# Patient Record
Sex: FEMALE | Race: OTHER | NOT HISPANIC OR LATINO | URBAN - METROPOLITAN AREA
[De-identification: names, ages, dates, MRNs, and addresses within clinical notes are randomized per-mention and may not be internally consistent; named-entity substitution may affect disease eponyms.]

---

## 2024-05-23 ENCOUNTER — INPATIENT (INPATIENT)
Facility: HOSPITAL | Age: 74
LOS: 5 days | Discharge: HOME HEALTH | End: 2024-05-29
Attending: HOSPITALIST | Admitting: HOSPITALIST
Payer: MEDICAID

## 2024-05-23 VITALS
HEART RATE: 106 BPM | DIASTOLIC BLOOD PRESSURE: 76 MMHG | RESPIRATION RATE: 16 BRPM | TEMPERATURE: 101 F | OXYGEN SATURATION: 100 % | WEIGHT: 160.94 LBS | SYSTOLIC BLOOD PRESSURE: 118 MMHG

## 2024-05-23 PROCEDURE — 99285 EMERGENCY DEPT VISIT HI MDM: CPT

## 2024-05-23 RX ORDER — ACETAMINOPHEN 500 MG
1000 TABLET ORAL ONCE
Refills: 0 | Status: COMPLETED | OUTPATIENT
Start: 2024-05-23 | End: 2024-05-23

## 2024-05-23 RX ORDER — CEFTRIAXONE 500 MG/1
1000 INJECTION, POWDER, FOR SOLUTION INTRAMUSCULAR; INTRAVENOUS ONCE
Refills: 0 | Status: COMPLETED | OUTPATIENT
Start: 2024-05-23 | End: 2024-05-23

## 2024-05-23 RX ORDER — ONDANSETRON 8 MG/1
4 TABLET, FILM COATED ORAL ONCE
Refills: 0 | Status: COMPLETED | OUTPATIENT
Start: 2024-05-23 | End: 2024-05-23

## 2024-05-23 RX ORDER — SODIUM CHLORIDE 9 MG/ML
1000 INJECTION INTRAMUSCULAR; INTRAVENOUS; SUBCUTANEOUS ONCE
Refills: 0 | Status: COMPLETED | OUTPATIENT
Start: 2024-05-23 | End: 2024-05-23

## 2024-05-23 NOTE — ED PROVIDER NOTE - OBJECTIVE STATEMENT
74-year-old female Persian speaking past medical history of hypertension presenting with vomiting and chills.  Patient states she was diagnosed with a UTI yesterday and started ciprofloxacin antibiotics today.  States 1 dose started at 4 PM and then began to have chills with rigors.  Endorses 2 episodes of nonbloody nonbilious vomiting.  Endorsing lower abdominal pain as well as right-sided back pain.  Denies any chest pain, shortness of breath, diarrhea, constipation.  Accompanied by son at bedside for translation

## 2024-05-23 NOTE — ED ADULT TRIAGE NOTE - CHIEF COMPLAINT QUOTE
pt c/o vomiting and chills today after starting antibiotics (cipro 250mg) for UTI. also c/o lower abd and back pain. hx. htn. pt febrile in triage.

## 2024-05-23 NOTE — ED PROVIDER NOTE - ATTENDING CONTRIBUTION TO CARE
DR. MCLEAN, ATTENDING MD-  I performed a face to face bedside interview with the patient regarding history of present illness, review of symptoms and past medical history. I completed an independent physical exam.  I have discussed the patient's plan of care with the resident.   Documentation as above in the note.    73 y/o female h/o htn with n/v chills r flank pain suprapubic pain since yesterday.  Started by pcp on cipro yesterday.  Worsening sx today.  Tachy febrile r cvat.  Likely pyelo.  Obtain cbc cmp blood cx x 2 vbg ua ucx ct a/p give ivf bolus abx antipyretic admit for further care and evaluation.

## 2024-05-23 NOTE — ED PROVIDER NOTE - PROGRESS NOTE DETAILS
Alfred Escalante DO (PGY2)  Notified by nurse that patient had low blood pressure systolic in the 80s, maps have been above 65, patient mentating well.  Patient still getting second liter of fluids.  Patient pending CT scan. Alfred Escalante DO (PGY2)  Patient still persistently hypotensive with maps from 62-65.  MICU was consulted for urosepsis and hypotension Alfred Escalante DO (PGY2)  Patient evaluated by MICU, patient not a MICU candidate at this time, maps above 65 on recent blood pressures, patient mentating well with improvement of symptoms.  Recommending midodrine 10 every 8 hours.  Will admit to medicine Alfred Escalante DO (PGY2)  Patient evaluated by MICU, Bedside POCUS was performed and minimal visualization of IVC. patient not a MICU candidate at this time And is rejected by the MICU team. patient mentating well with improvement of symptoms, no acute distress.  Recommending midodrine 10 every 8 hours.  Will admit to medicine

## 2024-05-23 NOTE — ED PROVIDER NOTE - CLINICAL SUMMARY MEDICAL DECISION MAKING FREE TEXT BOX
74-year-old female Hebrew speaking past medical history of hypertension presenting with vomiting and chills.  Patient states she was diagnosed with a UTI yesterday and started ciprofloxacin antibiotics today.  States 1 dose started at 4 PM and then began to have chills with rigors.  Endorses 2 episodes of nonbloody nonbilious vomiting.  Endorsing lower abdominal pain as well as right-sided back pain.   Vital signs remarkable for tachycardia, febrile. Plan for sepsis workup and CT abd pelvis  Differential diagnosis includes but not limited to pyelonephritis vs. intraabdominal pathology

## 2024-05-23 NOTE — ED PROVIDER NOTE - PHYSICAL EXAMINATION
Alfred Escalante DO (PGY2)   Physical Exam:    Gen: NAD, AOx3  Head: NCAT  HEENT: EOMI, PEERLA  Lung: CTAB, no respiratory distress, no wheezes/rhonchi/rales B/L  CV: RRR, no murmurs, rubs or gallops  Abd: soft, suprapubic tenderness to palpation, ND, no guarding, no rigidity, no rebound tenderness, no CVA tenderness   MSK: no visible deformities, ROM normal in UE/LE, no back pain  Neuro: No focal sensory or motor deficits. Sensation intact to light touch all extremities.  Skin: Warm, well perfused, no rash, no leg swelling  Psych: normal affect, calm

## 2024-05-24 DIAGNOSIS — I10 ESSENTIAL (PRIMARY) HYPERTENSION: ICD-10-CM

## 2024-05-24 DIAGNOSIS — N30.90 CYSTITIS, UNSPECIFIED WITHOUT HEMATURIA: ICD-10-CM

## 2024-05-24 DIAGNOSIS — K52.9 NONINFECTIVE GASTROENTERITIS AND COLITIS, UNSPECIFIED: ICD-10-CM

## 2024-05-24 DIAGNOSIS — Z86.018 PERSONAL HISTORY OF OTHER BENIGN NEOPLASM: Chronic | ICD-10-CM

## 2024-05-24 DIAGNOSIS — N39.0 URINARY TRACT INFECTION, SITE NOT SPECIFIED: ICD-10-CM

## 2024-05-24 LAB
ADD ON TEST-SPECIMEN IN LAB: SIGNIFICANT CHANGE UP
ALBUMIN SERPL ELPH-MCNC: 3.9 G/DL — SIGNIFICANT CHANGE UP (ref 3.3–5)
ALP SERPL-CCNC: 101 U/L — SIGNIFICANT CHANGE UP (ref 40–120)
ALT FLD-CCNC: 13 U/L — SIGNIFICANT CHANGE UP (ref 4–33)
ANION GAP SERPL CALC-SCNC: 15 MMOL/L — HIGH (ref 7–14)
APPEARANCE UR: ABNORMAL
APTT BLD: 21.3 SEC — LOW (ref 24.5–35.6)
AST SERPL-CCNC: 23 U/L — SIGNIFICANT CHANGE UP (ref 4–32)
BACTERIA # UR AUTO: ABNORMAL /HPF
BASOPHILS # BLD AUTO: 0 K/UL — SIGNIFICANT CHANGE UP (ref 0–0.2)
BASOPHILS NFR BLD AUTO: 0 % — SIGNIFICANT CHANGE UP (ref 0–2)
BILIRUB SERPL-MCNC: 1.8 MG/DL — HIGH (ref 0.2–1.2)
BILIRUB UR-MCNC: NEGATIVE — SIGNIFICANT CHANGE UP
BLOOD GAS VENOUS COMPREHENSIVE RESULT: SIGNIFICANT CHANGE UP
BLOOD GAS VENOUS COMPREHENSIVE RESULT: SIGNIFICANT CHANGE UP
BUN SERPL-MCNC: 21 MG/DL — SIGNIFICANT CHANGE UP (ref 7–23)
CALCIUM SERPL-MCNC: 9.1 MG/DL — SIGNIFICANT CHANGE UP (ref 8.4–10.5)
CAST: 4 /LPF — SIGNIFICANT CHANGE UP (ref 0–4)
CHLORIDE SERPL-SCNC: 99 MMOL/L — SIGNIFICANT CHANGE UP (ref 98–107)
CO2 SERPL-SCNC: 21 MMOL/L — LOW (ref 22–31)
COLOR SPEC: SIGNIFICANT CHANGE UP
CREAT SERPL-MCNC: 1.06 MG/DL — SIGNIFICANT CHANGE UP (ref 0.5–1.3)
DIFF PNL FLD: ABNORMAL
EGFR: 55 ML/MIN/1.73M2 — LOW
EOSINOPHIL # BLD AUTO: 0.06 K/UL — SIGNIFICANT CHANGE UP (ref 0–0.5)
EOSINOPHIL NFR BLD AUTO: 0.9 % — SIGNIFICANT CHANGE UP (ref 0–6)
GLUCOSE SERPL-MCNC: 113 MG/DL — HIGH (ref 70–99)
GLUCOSE UR QL: NEGATIVE MG/DL — SIGNIFICANT CHANGE UP
HCT VFR BLD CALC: 43.2 % — SIGNIFICANT CHANGE UP (ref 34.5–45)
HGB BLD-MCNC: 14.4 G/DL — SIGNIFICANT CHANGE UP (ref 11.5–15.5)
IANC: 6.75 K/UL — SIGNIFICANT CHANGE UP (ref 1.8–7.4)
INR BLD: 1.18 RATIO — SIGNIFICANT CHANGE UP (ref 0.85–1.18)
KETONES UR-MCNC: NEGATIVE MG/DL — SIGNIFICANT CHANGE UP
LEUKOCYTE ESTERASE UR-ACNC: ABNORMAL
LYMPHOCYTES # BLD AUTO: 0.2 K/UL — LOW (ref 1–3.3)
LYMPHOCYTES # BLD AUTO: 2.8 % — LOW (ref 13–44)
MCHC RBC-ENTMCNC: 29 PG — SIGNIFICANT CHANGE UP (ref 27–34)
MCHC RBC-ENTMCNC: 33.3 GM/DL — SIGNIFICANT CHANGE UP (ref 32–36)
MCV RBC AUTO: 86.9 FL — SIGNIFICANT CHANGE UP (ref 80–100)
MONOCYTES # BLD AUTO: 0 K/UL — SIGNIFICANT CHANGE UP (ref 0–0.9)
MONOCYTES NFR BLD AUTO: 0 % — LOW (ref 2–14)
MUCOUS THREADS # UR AUTO: PRESENT
NEUTROPHILS # BLD AUTO: 6.94 K/UL — SIGNIFICANT CHANGE UP (ref 1.8–7.4)
NEUTROPHILS NFR BLD AUTO: 90.8 % — HIGH (ref 43–77)
NITRITE UR-MCNC: POSITIVE
PH UR: 6 — SIGNIFICANT CHANGE UP (ref 5–8)
PLATELET # BLD AUTO: 216 K/UL — SIGNIFICANT CHANGE UP (ref 150–400)
POTASSIUM SERPL-MCNC: 3.2 MMOL/L — LOW (ref 3.5–5.3)
POTASSIUM SERPL-SCNC: 3.2 MMOL/L — LOW (ref 3.5–5.3)
PROT SERPL-MCNC: 7.1 G/DL — SIGNIFICANT CHANGE UP (ref 6–8.3)
PROT UR-MCNC: 30 MG/DL
PROTHROM AB SERPL-ACNC: 13.1 SEC — HIGH (ref 9.5–13)
RBC # BLD: 4.97 M/UL — SIGNIFICANT CHANGE UP (ref 3.8–5.2)
RBC # FLD: 13.3 % — SIGNIFICANT CHANGE UP (ref 10.3–14.5)
RBC CASTS # UR COMP ASSIST: 4 /HPF — SIGNIFICANT CHANGE UP (ref 0–4)
REVIEW: SIGNIFICANT CHANGE UP
SODIUM SERPL-SCNC: 135 MMOL/L — SIGNIFICANT CHANGE UP (ref 135–145)
SP GR SPEC: 1.01 — SIGNIFICANT CHANGE UP (ref 1–1.03)
SQUAMOUS # UR AUTO: 2 /HPF — SIGNIFICANT CHANGE UP (ref 0–5)
UROBILINOGEN FLD QL: 1 MG/DL — SIGNIFICANT CHANGE UP (ref 0.2–1)
WBC # BLD: 7.21 K/UL — SIGNIFICANT CHANGE UP (ref 3.8–10.5)
WBC # FLD AUTO: 7.21 K/UL — SIGNIFICANT CHANGE UP (ref 3.8–10.5)
WBC CLUMPS # UR AUTO: PRESENT
WBC UR QL: 228 /HPF — HIGH (ref 0–5)

## 2024-05-24 PROCEDURE — 99291 CRITICAL CARE FIRST HOUR: CPT | Mod: GC

## 2024-05-24 PROCEDURE — 74177 CT ABD & PELVIS W/CONTRAST: CPT | Mod: 26,MC

## 2024-05-24 PROCEDURE — 99291 CRITICAL CARE FIRST HOUR: CPT

## 2024-05-24 RX ORDER — LOSARTAN POTASSIUM 100 MG/1
1 TABLET, FILM COATED ORAL
Refills: 0 | DISCHARGE

## 2024-05-24 RX ORDER — ASPIRIN/CALCIUM CARB/MAGNESIUM 324 MG
81 TABLET ORAL DAILY
Refills: 0 | Status: DISCONTINUED | OUTPATIENT
Start: 2024-05-24 | End: 2024-05-29

## 2024-05-24 RX ORDER — POTASSIUM CHLORIDE 20 MEQ
40 PACKET (EA) ORAL ONCE
Refills: 0 | Status: COMPLETED | OUTPATIENT
Start: 2024-05-24 | End: 2024-05-24

## 2024-05-24 RX ORDER — MIDODRINE HYDROCHLORIDE 2.5 MG/1
10 TABLET ORAL EVERY 8 HOURS
Refills: 0 | Status: DISCONTINUED | OUTPATIENT
Start: 2024-05-24 | End: 2024-05-25

## 2024-05-24 RX ORDER — ACETAMINOPHEN 500 MG
650 TABLET ORAL EVERY 6 HOURS
Refills: 0 | Status: DISCONTINUED | OUTPATIENT
Start: 2024-05-24 | End: 2024-05-29

## 2024-05-24 RX ORDER — PIPERACILLIN AND TAZOBACTAM 4; .5 G/20ML; G/20ML
3.38 INJECTION, POWDER, LYOPHILIZED, FOR SOLUTION INTRAVENOUS ONCE
Refills: 0 | Status: COMPLETED | OUTPATIENT
Start: 2024-05-24 | End: 2024-05-24

## 2024-05-24 RX ORDER — SODIUM,POTASSIUM PHOSPHATES 278-250MG
1 POWDER IN PACKET (EA) ORAL
Refills: 0 | Status: COMPLETED | OUTPATIENT
Start: 2024-05-24 | End: 2024-05-24

## 2024-05-24 RX ORDER — ONDANSETRON 8 MG/1
4 TABLET, FILM COATED ORAL EVERY 8 HOURS
Refills: 0 | Status: DISCONTINUED | OUTPATIENT
Start: 2024-05-24 | End: 2024-05-29

## 2024-05-24 RX ORDER — ASPIRIN/CALCIUM CARB/MAGNESIUM 324 MG
1 TABLET ORAL
Refills: 0 | DISCHARGE

## 2024-05-24 RX ORDER — MIDODRINE HYDROCHLORIDE 2.5 MG/1
10 TABLET ORAL ONCE
Refills: 0 | Status: COMPLETED | OUTPATIENT
Start: 2024-05-24 | End: 2024-05-24

## 2024-05-24 RX ORDER — PIPERACILLIN AND TAZOBACTAM 4; .5 G/20ML; G/20ML
3.38 INJECTION, POWDER, LYOPHILIZED, FOR SOLUTION INTRAVENOUS EVERY 8 HOURS
Refills: 0 | Status: DISCONTINUED | OUTPATIENT
Start: 2024-05-24 | End: 2024-05-26

## 2024-05-24 RX ORDER — SODIUM CHLORIDE 9 MG/ML
1000 INJECTION, SOLUTION INTRAVENOUS
Refills: 0 | Status: DISCONTINUED | OUTPATIENT
Start: 2024-05-24 | End: 2024-05-25

## 2024-05-24 RX ORDER — SIMVASTATIN 20 MG/1
1 TABLET, FILM COATED ORAL
Refills: 0 | DISCHARGE

## 2024-05-24 RX ORDER — MIDODRINE HYDROCHLORIDE 2.5 MG/1
5 TABLET ORAL ONCE
Refills: 0 | Status: COMPLETED | OUTPATIENT
Start: 2024-05-24 | End: 2024-05-24

## 2024-05-24 RX ORDER — SODIUM CHLORIDE 9 MG/ML
1000 INJECTION INTRAMUSCULAR; INTRAVENOUS; SUBCUTANEOUS ONCE
Refills: 0 | Status: COMPLETED | OUTPATIENT
Start: 2024-05-24 | End: 2024-05-24

## 2024-05-24 RX ORDER — SODIUM CHLORIDE 9 MG/ML
500 INJECTION INTRAMUSCULAR; INTRAVENOUS; SUBCUTANEOUS ONCE
Refills: 0 | Status: COMPLETED | OUTPATIENT
Start: 2024-05-24 | End: 2024-05-24

## 2024-05-24 RX ADMIN — SODIUM CHLORIDE 1000 MILLILITER(S): 9 INJECTION INTRAMUSCULAR; INTRAVENOUS; SUBCUTANEOUS at 04:01

## 2024-05-24 RX ADMIN — Medication 650 MILLIGRAM(S): at 15:26

## 2024-05-24 RX ADMIN — PIPERACILLIN AND TAZOBACTAM 200 GRAM(S): 4; .5 INJECTION, POWDER, LYOPHILIZED, FOR SOLUTION INTRAVENOUS at 05:25

## 2024-05-24 RX ADMIN — MIDODRINE HYDROCHLORIDE 5 MILLIGRAM(S): 2.5 TABLET ORAL at 10:25

## 2024-05-24 RX ADMIN — MIDODRINE HYDROCHLORIDE 10 MILLIGRAM(S): 2.5 TABLET ORAL at 16:55

## 2024-05-24 RX ADMIN — ONDANSETRON 4 MILLIGRAM(S): 8 TABLET, FILM COATED ORAL at 12:52

## 2024-05-24 RX ADMIN — Medication 81 MILLIGRAM(S): at 22:11

## 2024-05-24 RX ADMIN — SODIUM CHLORIDE 70 MILLILITER(S): 9 INJECTION, SOLUTION INTRAVENOUS at 08:01

## 2024-05-24 RX ADMIN — SODIUM CHLORIDE 1000 MILLILITER(S): 9 INJECTION INTRAMUSCULAR; INTRAVENOUS; SUBCUTANEOUS at 01:12

## 2024-05-24 RX ADMIN — SODIUM CHLORIDE 70 MILLILITER(S): 9 INJECTION, SOLUTION INTRAVENOUS at 22:08

## 2024-05-24 RX ADMIN — Medication 40 MILLIEQUIVALENT(S): at 03:02

## 2024-05-24 RX ADMIN — PIPERACILLIN AND TAZOBACTAM 25 GRAM(S): 4; .5 INJECTION, POWDER, LYOPHILIZED, FOR SOLUTION INTRAVENOUS at 15:27

## 2024-05-24 RX ADMIN — ONDANSETRON 4 MILLIGRAM(S): 8 TABLET, FILM COATED ORAL at 22:09

## 2024-05-24 RX ADMIN — Medication 1 PACKET(S): at 08:01

## 2024-05-24 RX ADMIN — Medication 1 PACKET(S): at 16:56

## 2024-05-24 RX ADMIN — Medication 1 PACKET(S): at 12:47

## 2024-05-24 RX ADMIN — MIDODRINE HYDROCHLORIDE 10 MILLIGRAM(S): 2.5 TABLET ORAL at 05:52

## 2024-05-24 RX ADMIN — SODIUM CHLORIDE 1000 MILLILITER(S): 9 INJECTION INTRAMUSCULAR; INTRAVENOUS; SUBCUTANEOUS at 00:15

## 2024-05-24 RX ADMIN — Medication 400 MILLIGRAM(S): at 00:51

## 2024-05-24 RX ADMIN — ONDANSETRON 4 MILLIGRAM(S): 8 TABLET, FILM COATED ORAL at 00:15

## 2024-05-24 RX ADMIN — CEFTRIAXONE 100 MILLIGRAM(S): 500 INJECTION, POWDER, FOR SOLUTION INTRAMUSCULAR; INTRAVENOUS at 00:16

## 2024-05-24 RX ADMIN — SODIUM CHLORIDE 70 MILLILITER(S): 9 INJECTION, SOLUTION INTRAVENOUS at 10:26

## 2024-05-24 RX ADMIN — PIPERACILLIN AND TAZOBACTAM 25 GRAM(S): 4; .5 INJECTION, POWDER, LYOPHILIZED, FOR SOLUTION INTRAVENOUS at 22:08

## 2024-05-24 NOTE — H&P ADULT - PROBLEM SELECTOR PLAN 2
- Not clinically a colitis despite CT A&P findings  - Regardless, above antibiotics would cover this as well

## 2024-05-24 NOTE — H&P ADULT - NSHPREVIEWOFSYSTEMS_GEN_ALL_CORE
GEN: no night sweats; +poor appetite  EYES: no changes in vision or diplopia   ENT: no epistaxis, sinus pain, gingival bleeding, odynophagia or dysphagia  CV: no CP, PND or palpitations  RESP: no cough, wheezing, or hemoptysis  GI: no hematemesis, hematochezia, or melena; +nausea/vomiting  : +dysuria, +polyuria  MSK: no arthralgias or joint swelling   NEURO: no gross sensory changes, numbness, focal deficits  PSYCH: no depression or changes in concentration  HEME/ONC: no purpura, petechiae or night sweats  SKIN: no pruritus, hair loss or skin lesions  ALL: no photosensitivity, no complaints of anaphylaxis (SOB, throat swelling)

## 2024-05-24 NOTE — ED ADULT NURSE NOTE - OBJECTIVE STATEMENT
Received pt in 26a, pt is A&Ox4 and is Persian speaking with son at bedside translation. Pt past medical history of HTN, HLD, Pt came to the ED due to having lower abdomen pain and back pain for 2 days. pt went to her PCP and was given an antibiotic for a UTI and she started taking it but doesn't remember the name. Pt breathing is equal and nonlabored. Pt abdomen is soft and nondistended. Pt reports vomiting x2 since yesterday with some nausea and fevers and chills. Pt denies any chest pain, SOB, headache or diarrhea. pt has a 20g to the left forearm. Pt safety maintained.

## 2024-05-24 NOTE — CONSULT NOTE ADULT - ASSESSMENT
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 She is breathing comfortably, not tachycardic. She feels much better and looks well overall (if irritated/tired). Thankfully, her lactate has resolved and her other organs seem to be unaffected by the borderline blood pressures. She's feeling symptomatically better.    RECOMMENDATIONS  - Can consider Midodrine 10mg PO Q8H if concerned about borderline hypotension (Goal MAP > 65 or SBP > 100), reassess BP 30m-1h after (onset of action ~1h after taking)  - Would replete K > 4, Mg > 2, Phos > 3  - Pt currently not meeting criteria for ICU-level care, but please reconsult if her condition changes    João Barry MD PGY-3  Case D/W Dr. Monique

## 2024-05-24 NOTE — CONSULT NOTE ADULT - SUBJECTIVE AND OBJECTIVE BOX
SUBJECTIVE  CONSULT QUESTION: Patient hypotensive with +UA  SUMMARY OF HOSPITALIZATION / HPI  Ms. Leone is a 74F with h/o atrial myxoma (s/p removal), HTN who presented with chlils, vomiting, and dysuria ico outpatient-diagnosed UTI. She was started on ciprofloxacin outpatient but did not feel better so came to the ER. She feels better already here, no further vomiting or other symptoms. She is annoyed about IV access issues in the ER. Her son at [facility] interprets for her.  She denies CP/SOB, abdominal pain, diarrhea, numbness/tingling.    PAST MEDICAL/SURGICAL HISTORY  PAST MEDICAL & SURGICAL HISTORY:    ALLERGIES  Allergies    No Known Allergies    Intolerances        HOME MEDICATIONS:      HOSPITAL MEDICATIONS:  MEDICATIONS  (STANDING):  midodrine. 10 milliGRAM(s) Oral once    MEDICATIONS  (PRN):      REVIEW OF SYSTEMS:  [x] All other systems negative  [ ] Unable to assess ROS because ________    OBJECTIVE:  VITAL SIGNS  ICU Vital Signs Last 24 Hrs  T(C): 36.9 (24 May 2024 04:42), Max: 38.6 (23 May 2024 22:16)  T(F): 98.5 (24 May 2024 04:42), Max: 101.5 (23 May 2024 22:16)  HR: 62 (24 May 2024 04:42) (62 - 106)  BP: 82/61 (24 May 2024 05:43) (78/57 - 118/76)  BP(mean): 71 (24 May 2024 05:43) (66 - 71)  ABP: --  ABP(mean): --  RR: 22 (24 May 2024 04:42) (16 - 22)  SpO2: 99% (24 May 2024 04:42) (97% - 100%)    O2 Parameters below as of 24 May 2024 04:42  Patient On (Oxygen Delivery Method): room air    PHYSICAL EXAM:  GEN: Awake, AOx3, NAD.  HEENT: NCAT  CARDIO: RRR. Normal S1/S2, no m/r/g. No JVD.  RESP: CTAB, no w/r/r; normal respiratory effort  ABD: Soft, NTND.   MSK: No obvious deformity or ROM deficit.   SKIN: Warm, dry.   NEURO: Moves all four extremities spontaneously    LAB DATA                        14.4   7.21  )-----------( 216      ( 24 May 2024 00:00 )             43.2         135  |  99  |  21  ----------------------------<  113<H>  3.2<L>   |  21<L>  |  1.06    Ca    9.1      24 May 2024 00:00  Phos  2.1       Mg     1.90         TPro  7.1  /  Alb  3.9  /  TBili  1.8<H>  /  DBili  0.5<H>  /  AST  23  /  ALT  13  /  AlkPhos  101      PT/INR - ( 24 May 2024 00:00 )   PT: 13.1 sec;   INR: 1.18 ratio         PTT - ( 24 May 2024 00:00 )  PTT:21.3 sec      Urinalysis Basic - ( 24 May 2024 00:10 )    Color: Dark Yellow / Appearance: Cloudy / S.015 / pH: x  Gluc: x / Ketone: Negative mg/dL  / Bili: Negative / Urobili: 1.0 mg/dL   Blood: x / Protein: 30 mg/dL / Nitrite: Positive   Leuk Esterase: Large / RBC: 4 /HPF /  /HPF   Sq Epi: x / Non Sq Epi: 2 /HPF / Bacteria: Few /HPF      CAPILLARY BLOOD GLUCOSE      ADDITIONAL TESTS & IMAGING  RADIOLOGY:  < from: CT Abdomen and Pelvis w/ IV Cont (24 @ 02:34) >  FINDINGS:  Liver: Normal. No mass.  Gallbladder and bile ducts: Normal. No calcified stones. No ductal   dilation.  Pancreas: Normal. No ductal dilation.  Spleen: Normal. No splenomegaly.  Adrenal glands: Normal. No mass.  Kidneys and ureters: Prominent simple 6.0 cm left renal cyst.  Stomach and bowel: Sigmoid diverticulosis. No significant diverticulitis   or  other acute process noted.  Appendix: No evidence of appendicitis.    Intraperitoneal space: Unremarkable. No free air. No significant fluid  collection.  Vasculature: Unremarkable. No abdominal aortic aneurysm.  Lymph nodes: Unremarkable. No enlarged lymph nodes.  Urinary bladder: Unremarkable as visualized.  Reproductive: Unremarkable as visualized.  Bones/joints: Unremarkable. No acute fracture.  Soft tissues: Unremarkable.    IMPRESSION:  1.   No acute process to explain the patient&apos;s symptoms.  2.   Diverticulosis without diverticulitis.    < end of copied text >    MICROBIOLOGY:   UCX, BCx reportedly collected    CARDIOLOGY DATA:

## 2024-05-24 NOTE — CONSULT NOTE ADULT - ATTENDING COMMENTS
74 f with sepsis likely 2/2 UTI c/b n/v  pancx, trend lactate, procalcitonin, Abx  consider midodrine 10mg TID as tolerated  maintain MAP >65  follow up Ct scan   reconsult as needed

## 2024-05-24 NOTE — H&P ADULT - HISTORY OF PRESENT ILLNESS
74F with PMHx of HTN presenting for several day history of lower abdominal pain, polyuria, dysuria, fever, rigors, and night sweats. Collateral obtained from son at bedside (patient's native language is Macedonian). Per son several days prior patient began to develop above symptoms. She states abdominal pain is bilateral LE abdominal pain which radiates a in a bandlike fashion toward the back. Also endorsing minor urinary complaints. She went to her PMD and was empirically treated for UTI and given Ciprofloxacin (per son work up was sent i.e., UA, but not followed up on yet). She took only one dose of Ciprofloxacin. On the day of admission developed rigors and diaphoresis. Given these symptoms son brought patient in for further evaluation. Patient denies diarrhea. Has had periodic nausea and poor PO intake. In the ED on arrival was febrile to 101. 5 F and hypotensive to 82/61. She was given Zosyn, Ceftriaxone and 2.5 L NS. Seen by MICU and not a candidate. Started on midodrine to help support blood pressure. CT A&P showed: "Wall thickening of the descending and proximal sigmoid colon consistent with colitis." Patient without major complaints when evaluated by medicine in the ED. Denies lightheadedness/dizziness.

## 2024-05-24 NOTE — H&P ADULT - PROBLEM SELECTOR PLAN 1
- Likely UTI given symptoms and UA. No signs of ascending infection on CT  - Continue with Zosyn, de-escalate if possible  - Follow up blood and urine cultures  - Gently hydrate for now  - Start Midodrine 10 mg TID to support BP, wean as tolerated

## 2024-05-24 NOTE — H&P ADULT - NSHPPHYSICALEXAM_GEN_ALL_CORE
T(C): 36.9 (05-24-24 @ 04:42), Max: 38.6 (05-23-24 @ 22:16)  HR: 78 (05-24-24 @ 07:01) (62 - 106)  BP: 80/59 (05-24-24 @ 07:01) (78/57 - 118/76)  RR: 18 (05-24-24 @ 07:01) (16 - 22)  SpO2: 98% (05-24-24 @ 07:01) (97% - 100%)    GEN: female in NAD, appears comfortable, no diaphoresis  EYES: No scleral injection, PERRL, EOMI  ENTM: neck supple & symmetric without tracheal deviation, dry membranes, no gross hearing impairment, thyroid gland not enlarged  CV: +S1/S2, no m/r/g, no abdominal bruit, no LE edema  RESP: breathing comfortably, no respiratory accessory muscle use, CTAB, no w/r/r  GI: normoactive BS, soft, NTND, no rebounding/guarding, no palpable masses  LYMPHATICS: no LAD or tenderness to palpation  NEURO: AOx3, no focal deficits, CNII-XII grossly intact  PSYCH: No SI/HI/AVH, appropriate affect, appropriate insight/judgment   SKIN: no petechiae, ecchymosis or maculopapular rash noted

## 2024-05-24 NOTE — H&P ADULT - NSHPPOAPULMEMBOLUS_GEN_A_CORE
no
PAST MEDICAL HISTORY:  Asthma     Fractured hip     Hyperlipidemia     Hypertension     Lumbar back pain Pinched nerve

## 2024-05-24 NOTE — ED ADULT NURSE REASSESSMENT NOTE - NS ED NURSE REASSESS COMMENT FT1
Pt was vitaled and B/P was Low, MD Gonzales was made aware. Pt denies any headache, dizziness, SOB, lightheadedness. Pt moved to room 28 and placed on cardiac monitor. Vitals was repeated and still has a low B/P but Map was 65 and above. MD notified and ordered to continue to monitor. Pt is normal sinus on cardiac monitor.

## 2024-05-24 NOTE — PATIENT PROFILE ADULT - NSPRESCRUSEDDRG_GEN_A_NUR
,DirectAddress_Unknown,[email or web address]
No

## 2024-05-24 NOTE — CHART NOTE - NSCHARTNOTEFT_GEN_A_CORE
Called by RN due to patient having BP of 76/47, Asymptomatic. Patient denies lightheadedness, dizziness. RN states patient had walk to the bathroom without any complaints or problems. I informed the RN, patient should not be ambulating at this time. Patient ordered for stat Midodrine 5 mg, continue with Midodrine 10 mg TID, IVF LR @ 70 mL/hr.

## 2024-05-24 NOTE — H&P ADULT - ASSESSMENT
74F with PMHx of HTN presenting for several day history of lower abdominal pain, polyuria, dysuria, fever, rigors, and night sweats. Patient on arrival febrile and hypotensive. UA with pyuria. Patient admitted for sepsis secondary to cystitis.

## 2024-05-24 NOTE — PATIENT PROFILE ADULT - FALL HARM RISK - HARM RISK INTERVENTIONS

## 2024-05-25 DIAGNOSIS — A41.9 SEPSIS, UNSPECIFIED ORGANISM: ICD-10-CM

## 2024-05-25 LAB
ADD ON TEST-SPECIMEN IN LAB: SIGNIFICANT CHANGE UP
ANION GAP SERPL CALC-SCNC: 14 MMOL/L — SIGNIFICANT CHANGE UP (ref 7–14)
BUN SERPL-MCNC: 20 MG/DL — SIGNIFICANT CHANGE UP (ref 7–23)
CALCIUM SERPL-MCNC: 8.2 MG/DL — LOW (ref 8.4–10.5)
CHLORIDE SERPL-SCNC: 105 MMOL/L — SIGNIFICANT CHANGE UP (ref 98–107)
CO2 SERPL-SCNC: 19 MMOL/L — LOW (ref 22–31)
CREAT SERPL-MCNC: 1.05 MG/DL — SIGNIFICANT CHANGE UP (ref 0.5–1.3)
EGFR: 56 ML/MIN/1.73M2 — LOW
GLUCOSE SERPL-MCNC: 125 MG/DL — HIGH (ref 70–99)
HCT VFR BLD CALC: 36.5 % — SIGNIFICANT CHANGE UP (ref 34.5–45)
HGB BLD-MCNC: 12.2 G/DL — SIGNIFICANT CHANGE UP (ref 11.5–15.5)
MAGNESIUM SERPL-MCNC: 2.1 MG/DL — SIGNIFICANT CHANGE UP (ref 1.6–2.6)
MCHC RBC-ENTMCNC: 29.8 PG — SIGNIFICANT CHANGE UP (ref 27–34)
MCHC RBC-ENTMCNC: 33.4 GM/DL — SIGNIFICANT CHANGE UP (ref 32–36)
MCV RBC AUTO: 89.2 FL — SIGNIFICANT CHANGE UP (ref 80–100)
NRBC # BLD: 0 /100 WBCS — SIGNIFICANT CHANGE UP (ref 0–0)
NRBC # FLD: 0 K/UL — SIGNIFICANT CHANGE UP (ref 0–0)
PHOSPHATE SERPL-MCNC: 2.6 MG/DL — SIGNIFICANT CHANGE UP (ref 2.5–4.5)
PLATELET # BLD AUTO: 197 K/UL — SIGNIFICANT CHANGE UP (ref 150–400)
POTASSIUM SERPL-MCNC: 3.7 MMOL/L — SIGNIFICANT CHANGE UP (ref 3.5–5.3)
POTASSIUM SERPL-SCNC: 3.7 MMOL/L — SIGNIFICANT CHANGE UP (ref 3.5–5.3)
RBC # BLD: 4.09 M/UL — SIGNIFICANT CHANGE UP (ref 3.8–5.2)
RBC # FLD: 13.9 % — SIGNIFICANT CHANGE UP (ref 10.3–14.5)
SODIUM SERPL-SCNC: 138 MMOL/L — SIGNIFICANT CHANGE UP (ref 135–145)
WBC # BLD: 17.91 K/UL — HIGH (ref 3.8–10.5)
WBC # FLD AUTO: 17.91 K/UL — HIGH (ref 3.8–10.5)

## 2024-05-25 PROCEDURE — 99232 SBSQ HOSP IP/OBS MODERATE 35: CPT

## 2024-05-25 RX ORDER — SODIUM CHLORIDE 9 MG/ML
1000 INJECTION, SOLUTION INTRAVENOUS
Refills: 0 | Status: DISCONTINUED | OUTPATIENT
Start: 2024-05-25 | End: 2024-05-27

## 2024-05-25 RX ORDER — METOCLOPRAMIDE HCL 10 MG
10 TABLET ORAL ONCE
Refills: 0 | Status: COMPLETED | OUTPATIENT
Start: 2024-05-25 | End: 2024-05-25

## 2024-05-25 RX ORDER — MIDODRINE HYDROCHLORIDE 2.5 MG/1
5 TABLET ORAL EVERY 8 HOURS
Refills: 0 | Status: DISCONTINUED | OUTPATIENT
Start: 2024-05-25 | End: 2024-05-27

## 2024-05-25 RX ORDER — ENOXAPARIN SODIUM 100 MG/ML
40 INJECTION SUBCUTANEOUS EVERY 24 HOURS
Refills: 0 | Status: DISCONTINUED | OUTPATIENT
Start: 2024-05-25 | End: 2024-05-29

## 2024-05-25 RX ADMIN — MIDODRINE HYDROCHLORIDE 10 MILLIGRAM(S): 2.5 TABLET ORAL at 00:56

## 2024-05-25 RX ADMIN — Medication 81 MILLIGRAM(S): at 21:53

## 2024-05-25 RX ADMIN — PIPERACILLIN AND TAZOBACTAM 25 GRAM(S): 4; .5 INJECTION, POWDER, LYOPHILIZED, FOR SOLUTION INTRAVENOUS at 13:15

## 2024-05-25 RX ADMIN — PIPERACILLIN AND TAZOBACTAM 25 GRAM(S): 4; .5 INJECTION, POWDER, LYOPHILIZED, FOR SOLUTION INTRAVENOUS at 06:28

## 2024-05-25 RX ADMIN — SODIUM CHLORIDE 70 MILLILITER(S): 9 INJECTION, SOLUTION INTRAVENOUS at 21:03

## 2024-05-25 RX ADMIN — MIDODRINE HYDROCHLORIDE 5 MILLIGRAM(S): 2.5 TABLET ORAL at 09:16

## 2024-05-25 RX ADMIN — Medication 10 MILLIGRAM(S): at 04:32

## 2024-05-25 RX ADMIN — PIPERACILLIN AND TAZOBACTAM 25 GRAM(S): 4; .5 INJECTION, POWDER, LYOPHILIZED, FOR SOLUTION INTRAVENOUS at 21:49

## 2024-05-25 NOTE — PROGRESS NOTE ADULT - NSPROGADDITIONALINFOA_GEN_ALL_CORE
DVT ppx: Lovenox  diet: Clear liquid diet and advance as tolerated     Dispo: pending once medically optimized     Discussed plan of care with son over the phone while at bedside and all qs answered on 5/25.

## 2024-05-26 DIAGNOSIS — Z29.9 ENCOUNTER FOR PROPHYLACTIC MEASURES, UNSPECIFIED: ICD-10-CM

## 2024-05-26 LAB
ADD ON TEST-SPECIMEN IN LAB: SIGNIFICANT CHANGE UP
ANION GAP SERPL CALC-SCNC: 12 MMOL/L — SIGNIFICANT CHANGE UP (ref 7–14)
BASE EXCESS BLDV CALC-SCNC: -0.8 MMOL/L — SIGNIFICANT CHANGE UP (ref -2–3)
BASE EXCESS BLDV CALC-SCNC: 0 MMOL/L — SIGNIFICANT CHANGE UP (ref -2–3)
BUN SERPL-MCNC: 18 MG/DL — SIGNIFICANT CHANGE UP (ref 7–23)
CA-I SERPL-SCNC: 1.22 MMOL/L — SIGNIFICANT CHANGE UP (ref 1.15–1.33)
CA-I SERPL-SCNC: 1.23 MMOL/L — SIGNIFICANT CHANGE UP (ref 1.15–1.33)
CALCIUM SERPL-MCNC: 8.7 MG/DL — SIGNIFICANT CHANGE UP (ref 8.4–10.5)
CHLORIDE BLDV-SCNC: 107 MMOL/L — SIGNIFICANT CHANGE UP (ref 96–108)
CHLORIDE BLDV-SCNC: 107 MMOL/L — SIGNIFICANT CHANGE UP (ref 96–108)
CHLORIDE SERPL-SCNC: 107 MMOL/L — SIGNIFICANT CHANGE UP (ref 98–107)
CO2 BLDV-SCNC: 24.6 MMOL/L — SIGNIFICANT CHANGE UP (ref 22–26)
CO2 BLDV-SCNC: 24.9 MMOL/L — SIGNIFICANT CHANGE UP (ref 22–26)
CO2 SERPL-SCNC: 20 MMOL/L — LOW (ref 22–31)
CREAT SERPL-MCNC: 1.1 MG/DL — SIGNIFICANT CHANGE UP (ref 0.5–1.3)
EC EAEA GENE STL QL NAA+PROBE: DETECTED
EGFR: 53 ML/MIN/1.73M2 — LOW
FLUAV AG NPH QL: SIGNIFICANT CHANGE UP
FLUBV AG NPH QL: SIGNIFICANT CHANGE UP
GAS PNL BLDV: 135 MMOL/L — LOW (ref 136–145)
GAS PNL BLDV: 137 MMOL/L — SIGNIFICANT CHANGE UP (ref 136–145)
GAS PNL BLDV: SIGNIFICANT CHANGE UP
GI PCR PANEL: DETECTED
GLUCOSE BLDV-MCNC: 104 MG/DL — HIGH (ref 70–99)
GLUCOSE BLDV-MCNC: 156 MG/DL — HIGH (ref 70–99)
GLUCOSE SERPL-MCNC: 104 MG/DL — HIGH (ref 70–99)
HCO3 BLDV-SCNC: 24 MMOL/L — SIGNIFICANT CHANGE UP (ref 22–29)
HCO3 BLDV-SCNC: 24 MMOL/L — SIGNIFICANT CHANGE UP (ref 22–29)
HCT VFR BLD CALC: 37.1 % — SIGNIFICANT CHANGE UP (ref 34.5–45)
HCT VFR BLDA CALC: 37 % — SIGNIFICANT CHANGE UP (ref 34.5–46.5)
HCT VFR BLDA CALC: 39 % — SIGNIFICANT CHANGE UP (ref 34.5–46.5)
HGB BLD CALC-MCNC: 12.2 G/DL — SIGNIFICANT CHANGE UP (ref 11.7–16.1)
HGB BLD CALC-MCNC: 13.1 G/DL — SIGNIFICANT CHANGE UP (ref 11.7–16.1)
HGB BLD-MCNC: 12.4 G/DL — SIGNIFICANT CHANGE UP (ref 11.5–15.5)
LACTATE BLDV-MCNC: 1.2 MMOL/L — SIGNIFICANT CHANGE UP (ref 0.5–2)
LACTATE BLDV-MCNC: 2.3 MMOL/L — HIGH (ref 0.5–2)
MAGNESIUM SERPL-MCNC: 2.3 MG/DL — SIGNIFICANT CHANGE UP (ref 1.6–2.6)
MCHC RBC-ENTMCNC: 29.5 PG — SIGNIFICANT CHANGE UP (ref 27–34)
MCHC RBC-ENTMCNC: 33.4 GM/DL — SIGNIFICANT CHANGE UP (ref 32–36)
MCV RBC AUTO: 88.1 FL — SIGNIFICANT CHANGE UP (ref 80–100)
NRBC # BLD: 0 /100 WBCS — SIGNIFICANT CHANGE UP (ref 0–0)
NRBC # FLD: 0 K/UL — SIGNIFICANT CHANGE UP (ref 0–0)
PCO2 BLDV: 35 MMHG — LOW (ref 39–52)
PCO2 BLDV: 37 MMHG — LOW (ref 39–52)
PH BLDV: 7.41 — SIGNIFICANT CHANGE UP (ref 7.32–7.43)
PH BLDV: 7.44 — HIGH (ref 7.32–7.43)
PHOSPHATE SERPL-MCNC: 2.6 MG/DL — SIGNIFICANT CHANGE UP (ref 2.5–4.5)
PLATELET # BLD AUTO: 197 K/UL — SIGNIFICANT CHANGE UP (ref 150–400)
PO2 BLDV: 109 MMHG — HIGH (ref 25–45)
PO2 BLDV: 69 MMHG — HIGH (ref 25–45)
POTASSIUM BLDV-SCNC: 3.7 MMOL/L — SIGNIFICANT CHANGE UP (ref 3.5–5.1)
POTASSIUM BLDV-SCNC: 3.9 MMOL/L — SIGNIFICANT CHANGE UP (ref 3.5–5.1)
POTASSIUM SERPL-MCNC: 4 MMOL/L — SIGNIFICANT CHANGE UP (ref 3.5–5.3)
POTASSIUM SERPL-SCNC: 4 MMOL/L — SIGNIFICANT CHANGE UP (ref 3.5–5.3)
RBC # BLD: 4.21 M/UL — SIGNIFICANT CHANGE UP (ref 3.8–5.2)
RBC # FLD: 13.8 % — SIGNIFICANT CHANGE UP (ref 10.3–14.5)
RSV RNA NPH QL NAA+NON-PROBE: SIGNIFICANT CHANGE UP
SAO2 % BLDV: 95.9 % — HIGH (ref 67–88)
SAO2 % BLDV: 98.4 % — HIGH (ref 67–88)
SARS-COV-2 RNA SPEC QL NAA+PROBE: SIGNIFICANT CHANGE UP
SODIUM SERPL-SCNC: 139 MMOL/L — SIGNIFICANT CHANGE UP (ref 135–145)
WBC # BLD: 8.12 K/UL — SIGNIFICANT CHANGE UP (ref 3.8–10.5)
WBC # FLD AUTO: 8.12 K/UL — SIGNIFICANT CHANGE UP (ref 3.8–10.5)

## 2024-05-26 PROCEDURE — 99232 SBSQ HOSP IP/OBS MODERATE 35: CPT

## 2024-05-26 PROCEDURE — 71045 X-RAY EXAM CHEST 1 VIEW: CPT | Mod: 26

## 2024-05-26 RX ORDER — ERTAPENEM SODIUM 1 G/1
1000 INJECTION, POWDER, LYOPHILIZED, FOR SOLUTION INTRAMUSCULAR; INTRAVENOUS ONCE
Refills: 0 | Status: DISCONTINUED | OUTPATIENT
Start: 2024-05-26 | End: 2024-05-26

## 2024-05-26 RX ORDER — PIPERACILLIN AND TAZOBACTAM 4; .5 G/20ML; G/20ML
3.38 INJECTION, POWDER, LYOPHILIZED, FOR SOLUTION INTRAVENOUS EVERY 8 HOURS
Refills: 0 | Status: DISCONTINUED | OUTPATIENT
Start: 2024-05-26 | End: 2024-05-27

## 2024-05-26 RX ORDER — VANCOMYCIN HCL 1 G
1000 VIAL (EA) INTRAVENOUS ONCE
Refills: 0 | Status: COMPLETED | OUTPATIENT
Start: 2024-05-26 | End: 2024-05-26

## 2024-05-26 RX ORDER — ERTAPENEM SODIUM 1 G/1
INJECTION, POWDER, LYOPHILIZED, FOR SOLUTION INTRAMUSCULAR; INTRAVENOUS
Refills: 0 | Status: DISCONTINUED | OUTPATIENT
Start: 2024-05-26 | End: 2024-05-26

## 2024-05-26 RX ORDER — SODIUM CHLORIDE 9 MG/ML
500 INJECTION, SOLUTION INTRAVENOUS ONCE
Refills: 0 | Status: COMPLETED | OUTPATIENT
Start: 2024-05-26 | End: 2024-05-26

## 2024-05-26 RX ADMIN — MIDODRINE HYDROCHLORIDE 5 MILLIGRAM(S): 2.5 TABLET ORAL at 02:08

## 2024-05-26 RX ADMIN — Medication 81 MILLIGRAM(S): at 21:24

## 2024-05-26 RX ADMIN — Medication 650 MILLIGRAM(S): at 17:15

## 2024-05-26 RX ADMIN — PIPERACILLIN AND TAZOBACTAM 25 GRAM(S): 4; .5 INJECTION, POWDER, LYOPHILIZED, FOR SOLUTION INTRAVENOUS at 21:36

## 2024-05-26 RX ADMIN — PIPERACILLIN AND TAZOBACTAM 25 GRAM(S): 4; .5 INJECTION, POWDER, LYOPHILIZED, FOR SOLUTION INTRAVENOUS at 14:16

## 2024-05-26 RX ADMIN — ENOXAPARIN SODIUM 40 MILLIGRAM(S): 100 INJECTION SUBCUTANEOUS at 14:16

## 2024-05-26 RX ADMIN — SODIUM CHLORIDE 70 MILLILITER(S): 9 INJECTION, SOLUTION INTRAVENOUS at 09:55

## 2024-05-26 RX ADMIN — Medication 650 MILLIGRAM(S): at 02:08

## 2024-05-26 RX ADMIN — Medication 650 MILLIGRAM(S): at 03:26

## 2024-05-26 RX ADMIN — SODIUM CHLORIDE 500 MILLILITER(S): 9 INJECTION, SOLUTION INTRAVENOUS at 18:01

## 2024-05-26 RX ADMIN — Medication 250 MILLIGRAM(S): at 21:23

## 2024-05-26 RX ADMIN — PIPERACILLIN AND TAZOBACTAM 25 GRAM(S): 4; .5 INJECTION, POWDER, LYOPHILIZED, FOR SOLUTION INTRAVENOUS at 05:43

## 2024-05-26 RX ADMIN — Medication 650 MILLIGRAM(S): at 18:06

## 2024-05-26 NOTE — CHART NOTE - NSCHARTNOTEFT_GEN_A_CORE
admitted yesterday + uti on zosyn, bc x 2 ngtd, uc >100k ecoli. notified febrile 101.1, tylenol given vitals - 130/76 hr 64 rr 16 on ra 100% receiving 500cc bolus.  midodrine hold sbp > 120. admitted yesterday + uti on zosyn, bc x 2 ngtd, uc >100k ecoli. notified febrile 101.1, tylenol given vitals - 130/76 hr 64 rr 16 on ra 100% receiving 500cc bolus.  midodrine hold sbp > 120.    - + diarrhea - gi pcr and stool culture sent

## 2024-05-27 LAB
-  AMPICILLIN/SULBACTAM: SIGNIFICANT CHANGE UP
-  AMPICILLIN: SIGNIFICANT CHANGE UP
-  AZTREONAM: SIGNIFICANT CHANGE UP
-  CEFAZOLIN: SIGNIFICANT CHANGE UP
-  CEFEPIME: SIGNIFICANT CHANGE UP
-  CEFTRIAXONE: SIGNIFICANT CHANGE UP
-  CEFUROXIME: SIGNIFICANT CHANGE UP
-  CIPROFLOXACIN: SIGNIFICANT CHANGE UP
-  ERTAPENEM: SIGNIFICANT CHANGE UP
-  GENTAMICIN: SIGNIFICANT CHANGE UP
-  IMIPENEM: SIGNIFICANT CHANGE UP
-  LEVOFLOXACIN: SIGNIFICANT CHANGE UP
-  MEROPENEM: SIGNIFICANT CHANGE UP
-  NITROFURANTOIN: SIGNIFICANT CHANGE UP
-  PIPERACILLIN/TAZOBACTAM: SIGNIFICANT CHANGE UP
-  TOBRAMYCIN: SIGNIFICANT CHANGE UP
-  TRIMETHOPRIM/SULFAMETHOXAZOLE: SIGNIFICANT CHANGE UP
ALBUMIN SERPL ELPH-MCNC: 3 G/DL — LOW (ref 3.3–5)
ALP SERPL-CCNC: 141 U/L — HIGH (ref 40–120)
ALT FLD-CCNC: 17 U/L — SIGNIFICANT CHANGE UP (ref 4–33)
ANION GAP SERPL CALC-SCNC: 11 MMOL/L — SIGNIFICANT CHANGE UP (ref 7–14)
AST SERPL-CCNC: 19 U/L — SIGNIFICANT CHANGE UP (ref 4–32)
BASOPHILS # BLD AUTO: 0.07 K/UL — SIGNIFICANT CHANGE UP (ref 0–0.2)
BASOPHILS NFR BLD AUTO: 1 % — SIGNIFICANT CHANGE UP (ref 0–2)
BILIRUB SERPL-MCNC: 0.6 MG/DL — SIGNIFICANT CHANGE UP (ref 0.2–1.2)
BUN SERPL-MCNC: 10 MG/DL — SIGNIFICANT CHANGE UP (ref 7–23)
CALCIUM SERPL-MCNC: 8.8 MG/DL — SIGNIFICANT CHANGE UP (ref 8.4–10.5)
CHLORIDE SERPL-SCNC: 107 MMOL/L — SIGNIFICANT CHANGE UP (ref 98–107)
CO2 SERPL-SCNC: 23 MMOL/L — SIGNIFICANT CHANGE UP (ref 22–31)
CREAT SERPL-MCNC: 1 MG/DL — SIGNIFICANT CHANGE UP (ref 0.5–1.3)
CULTURE RESULTS: ABNORMAL
EGFR: 59 ML/MIN/1.73M2 — LOW
EOSINOPHIL # BLD AUTO: 0.2 K/UL — SIGNIFICANT CHANGE UP (ref 0–0.5)
EOSINOPHIL NFR BLD AUTO: 2.8 % — SIGNIFICANT CHANGE UP (ref 0–6)
GLUCOSE SERPL-MCNC: 111 MG/DL — HIGH (ref 70–99)
HCT VFR BLD CALC: 36 % — SIGNIFICANT CHANGE UP (ref 34.5–45)
HGB BLD-MCNC: 12.2 G/DL — SIGNIFICANT CHANGE UP (ref 11.5–15.5)
IANC: 4.57 K/UL — SIGNIFICANT CHANGE UP (ref 1.8–7.4)
IMM GRANULOCYTES NFR BLD AUTO: 0.6 % — SIGNIFICANT CHANGE UP (ref 0–0.9)
LYMPHOCYTES # BLD AUTO: 1.4 K/UL — SIGNIFICANT CHANGE UP (ref 1–3.3)
LYMPHOCYTES # BLD AUTO: 19.6 % — SIGNIFICANT CHANGE UP (ref 13–44)
MAGNESIUM SERPL-MCNC: 2 MG/DL — SIGNIFICANT CHANGE UP (ref 1.6–2.6)
MCHC RBC-ENTMCNC: 29.7 PG — SIGNIFICANT CHANGE UP (ref 27–34)
MCHC RBC-ENTMCNC: 33.9 GM/DL — SIGNIFICANT CHANGE UP (ref 32–36)
MCV RBC AUTO: 87.6 FL — SIGNIFICANT CHANGE UP (ref 80–100)
METHOD TYPE: SIGNIFICANT CHANGE UP
MONOCYTES # BLD AUTO: 0.86 K/UL — SIGNIFICANT CHANGE UP (ref 0–0.9)
MONOCYTES NFR BLD AUTO: 12 % — SIGNIFICANT CHANGE UP (ref 2–14)
NEUTROPHILS # BLD AUTO: 4.57 K/UL — SIGNIFICANT CHANGE UP (ref 1.8–7.4)
NEUTROPHILS NFR BLD AUTO: 64 % — SIGNIFICANT CHANGE UP (ref 43–77)
NRBC # BLD: 0 /100 WBCS — SIGNIFICANT CHANGE UP (ref 0–0)
NRBC # FLD: 0 K/UL — SIGNIFICANT CHANGE UP (ref 0–0)
ORGANISM # SPEC MICROSCOPIC CNT: ABNORMAL
ORGANISM # SPEC MICROSCOPIC CNT: ABNORMAL
PHOSPHATE SERPL-MCNC: 2.6 MG/DL — SIGNIFICANT CHANGE UP (ref 2.5–4.5)
PLATELET # BLD AUTO: 208 K/UL — SIGNIFICANT CHANGE UP (ref 150–400)
POTASSIUM SERPL-MCNC: 3.9 MMOL/L — SIGNIFICANT CHANGE UP (ref 3.5–5.3)
POTASSIUM SERPL-SCNC: 3.9 MMOL/L — SIGNIFICANT CHANGE UP (ref 3.5–5.3)
PROT SERPL-MCNC: 5.7 G/DL — LOW (ref 6–8.3)
RBC # BLD: 4.11 M/UL — SIGNIFICANT CHANGE UP (ref 3.8–5.2)
RBC # FLD: 13.3 % — SIGNIFICANT CHANGE UP (ref 10.3–14.5)
SODIUM SERPL-SCNC: 141 MMOL/L — SIGNIFICANT CHANGE UP (ref 135–145)
SPECIMEN SOURCE: SIGNIFICANT CHANGE UP
WBC # BLD: 7.14 K/UL — SIGNIFICANT CHANGE UP (ref 3.8–10.5)
WBC # FLD AUTO: 7.14 K/UL — SIGNIFICANT CHANGE UP (ref 3.8–10.5)

## 2024-05-27 PROCEDURE — 71250 CT THORAX DX C-: CPT | Mod: 26

## 2024-05-27 PROCEDURE — 99254 IP/OBS CNSLTJ NEW/EST MOD 60: CPT | Mod: GC

## 2024-05-27 PROCEDURE — 99233 SBSQ HOSP IP/OBS HIGH 50: CPT

## 2024-05-27 RX ORDER — ERTAPENEM SODIUM 1 G/1
1000 INJECTION, POWDER, LYOPHILIZED, FOR SOLUTION INTRAMUSCULAR; INTRAVENOUS ONCE
Refills: 0 | Status: COMPLETED | OUTPATIENT
Start: 2024-05-27 | End: 2024-05-27

## 2024-05-27 RX ORDER — ERTAPENEM SODIUM 1 G/1
1000 INJECTION, POWDER, LYOPHILIZED, FOR SOLUTION INTRAMUSCULAR; INTRAVENOUS EVERY 24 HOURS
Refills: 0 | Status: DISCONTINUED | OUTPATIENT
Start: 2024-05-28 | End: 2024-05-29

## 2024-05-27 RX ORDER — ERTAPENEM SODIUM 1 G/1
INJECTION, POWDER, LYOPHILIZED, FOR SOLUTION INTRAMUSCULAR; INTRAVENOUS
Refills: 0 | Status: DISCONTINUED | OUTPATIENT
Start: 2024-05-27 | End: 2024-05-29

## 2024-05-27 RX ADMIN — ERTAPENEM SODIUM 1000 MILLIGRAM(S): 1 INJECTION, POWDER, LYOPHILIZED, FOR SOLUTION INTRAMUSCULAR; INTRAVENOUS at 17:46

## 2024-05-27 RX ADMIN — ENOXAPARIN SODIUM 40 MILLIGRAM(S): 100 INJECTION SUBCUTANEOUS at 13:29

## 2024-05-27 RX ADMIN — Medication 650 MILLIGRAM(S): at 05:22

## 2024-05-27 RX ADMIN — PIPERACILLIN AND TAZOBACTAM 25 GRAM(S): 4; .5 INJECTION, POWDER, LYOPHILIZED, FOR SOLUTION INTRAVENOUS at 13:29

## 2024-05-27 RX ADMIN — Medication 81 MILLIGRAM(S): at 21:54

## 2024-05-27 RX ADMIN — Medication 650 MILLIGRAM(S): at 06:27

## 2024-05-27 RX ADMIN — PIPERACILLIN AND TAZOBACTAM 25 GRAM(S): 4; .5 INJECTION, POWDER, LYOPHILIZED, FOR SOLUTION INTRAVENOUS at 05:12

## 2024-05-27 NOTE — CONSULT NOTE ADULT - ATTENDING COMMENTS
This is a 73 y/o F w/ PMHx of HTN who presented to [facility] on 5/24 for lower abdominal pain, dysuria, fever. Pt went to PMD and was started on Ciprofloxacin for UTI, U/A was sent but not UCx?  Pt started to have fevers and presented to the ED.   In the ER, pt was febrile to 101.5, hypotensive to 82/61, saturating well on RA.   Labs with leukocytosis to 17.9 (initially 7), lactate 2.6.   U/A with significant pyuria, BCx NGTD, UCx with E Coli.   CT A/P with wall thickening c/f colitis. GI PCR with EAEC  CT Chest negative, Flu/RSV/COVID .    #Septic shock   #ESBL E Coli   #UTI/Pyelonephritis   #Colitis 2/2 EAEC     Overall,  73 y/o F w/ PMHx of HTN presenting with septic shock 2/2 UTI vs colitis. Initially febrile w/ leukocytosis to 17, UCx w/ E Coli, and CT A/P with colitis, GI PCR w/ EAEC. Initially started on Ceftriaxone, then changed to Zosyn.   Now improved with improvement in BP and resolution of leukocytosis. UCx w/ ESBL, would start Ertapenem, colitis unlikely to need additional coverage, EAEC usually symptomatic management.     Plan:   1. Ertapenem 1 g q24 for 7 day course, until 6/2/24   2. Monitor for fevers, clinical improvement     Thank you for this consult. Inpatient ID team will follow peripherally     Ezequiel Garcia M.D.  Attending Physician  Division of Infectious Diseases  Department of Medicine    Please contact through MS Teams message.  Office: [phone] (after 5 PM or weekend)

## 2024-05-27 NOTE — PROVIDER CONTACT NOTE (OTHER) - ASSESSMENT
Patient A&Ox4, no acute distress noted, patient denies pain. Patient has temperature of 100.4F, HR 61 /65, O2, 95%.
pt. complains of loose stool.  On IV zosyn  for UTI
pt is A&Ox4, temp of 100.4 upon morning vital assessment. no complains of pain.
pt Is A&ox4 pt nauseous and vomiting given zofran. no other complaints
pt A&Ox4, vss
pt Is A&ox4 no complaints
pt Is A&ox4 pt nauseous given zofran. no other complaints

## 2024-05-27 NOTE — PROVIDER CONTACT NOTE (OTHER) - DATE AND TIME:
27-May-2024 05:15
26-May-2024 18:08
27-May-2024 14:40
24-May-2024 20:30
25-May-2024 00:40
25-May-2024 04:00
26-May-2024 02:10

## 2024-05-27 NOTE — PROVIDER CONTACT NOTE (OTHER) - SITUATION
HR 54
pt's urine culture from 5/24/27  resulted greater than 100,000 CFU/ml. ecoli ESBL
HR 50
HR 50
Oral temperature 100.4F
pt. with oral temperature 101.1
pt with oral temperature of 100.4.

## 2024-05-27 NOTE — PROVIDER CONTACT NOTE (OTHER) - ACTION/TREATMENT ORDERED:
Dimitrios SCHULER made aware. changed to different antibiotics. will continue to monitor.
provider notified
provider notified
PO acetaminophen administered, awaiting orders from provider raoul alaniz
provider asked for this recheck and was notified . reglan ordered
No new orders.  Continue to monitor.
PRN oral tylenol, continue to monitor

## 2024-05-27 NOTE — PROVIDER CONTACT NOTE (OTHER) - BACKGROUND
pt admitted for UTI and hypotension. pmh of htn
Pt. adm w/vomiting and chills and diarrhea.  Dx with UTI.  Pt. with hx of HTN and hyperlipidemia.
pt admitted for UTI and hypotension. pmh of htn
pt admitted for N/V, abdominal pain, fever, diarrhea. pmhx of HTN, hyperlipidemia
pt admitted for UTI and hypotension. pmh of htn
Patient A&Ox4, admitted for urinary tract infection.
pt admitted with N/V, diarrhea, abdominal pain

## 2024-05-27 NOTE — PROGRESS NOTE ADULT - TIME BILLING
Review of laboratory data, radiology results, consultants' recommendations, documentation in [place], discussion with patient/house staff and interdisciplinary staff (such as , social workers, etc). Interventions were performed as documented above.

## 2024-05-27 NOTE — PROVIDER CONTACT NOTE (OTHER) - NAME OF MD/NP/PA/DO NOTIFIED:
katty Keller
Arianna Childs
Dimitrios Perry
katty Keller
katty Keller
Arianna Childs
Isidra MONTALVO

## 2024-05-27 NOTE — PROVIDER CONTACT NOTE (OTHER) - REASON
Patient temperature
Fever 101.1
pt HR 54
pt HR 50
pt with oral temp of 100.4
pt HR 50
positive urine culture

## 2024-05-27 NOTE — PROGRESS NOTE ADULT - ATTENDING COMMENTS
Review of laboratory data, radiology results, consultants' recommendations, documentation in [place], discussion with patient/house staff and interdisciplinary staff (such as , social workers, etc). Interventions were performed as documented above.

## 2024-05-27 NOTE — CONSULT NOTE ADULT - SUBJECTIVE AND OBJECTIVE BOX
HPI:    74F with PMHx of HTN presented on 5/24 for several day history of lower abdominal pain, polyuria, dysuria, fever, rigors, and night sweats. She states abdominal pain is bilateral which radiates a in a bandlike fashion toward the back. Also endorsing minor urinary complaints. She went to her PMD and was empirically treated for UTI and given Ciprofloxacin (per son work up was sent i.e., UA, but not followed up on yet). She took only one dose of Ciprofloxacin. On the day of admission developed rigors and diaphoresis. Given these symptoms son brought patient in for further evaluation. Patient denies diarrhea. Has had periodic nausea and poor PO intake. In the ED on arrival was febrile to 101. 5 F and hypotensive to 82/61. She was given Zosyn, Ceftriaxone and 2.5 L NS. Seen by MICU and not a candidate. Started on midodrine to help support blood pressure. CT A&P showed: "Wall thickening of the descending and proximal sigmoid colon consistent with colitis." Patient without major complaints when evaluated by medicine in the ED. Denies lightheadedness/dizziness. (24 May 2024 06:23)       REVIEW OF SYSTEMS  [  ] ROS unobtainable because:    [  ] All other systems negative except as noted below    Constitutional:  [ ] fever [ ] chills  [ ] weight loss  [ ]night sweat  [ ]poor appetite/PO intake [ ]fatigue   Skin:  [ ] rash [ ] phlebitis	  Eyes: [ ] icterus [ ] pain  [ ] discharge	  ENMT: [ ] sore throat  [ ] thrush [ ] ulcers [ ] exudates [ ]anosmia  Respiratory: [ ] dyspnea [ ] hemoptysis [ ] cough [ ] sputum	  Cardiovascular:  [ ] chest pain [ ] palpitations [ ] edema	  Gastrointestinal:  [ ] nausea [ ] vomiting [ ] diarrhea [ ] constipation [ ] pain	  Genitourinary:  [ ] dysuria [ ] frequency [ ] hematuria [ ] discharge [ ] flank pain  [ ] incontinence  Musculoskeletal:  [ ] myalgias [ ] arthralgias [ ] arthritis  [ ] back pain  Neurological:  [ ] headache [ ] weakness [ ] seizures  [ ] confusion/altered mental status    prior hospital charts reviewed [V]  primary team notes reviewed [V]  other consultant notes reviewed [V]    PAST MEDICAL & SURGICAL HISTORY:  Hypertension    Hyperlipidemia    History of atrial myxoma    SOCIAL HISTORY:  - Denied smoking/vaping/alcohol/recreational drug use    FAMILY HISTORY:  FH: hypertension    Allergies  No Known Allergies    ANTIMICROBIALS:  piperacillin/tazobactam IVPB.. 3.375 every 8 hours    ANTIMICROBIALS (past 90 days):  MEDICATIONS  (STANDING):  cefTRIAXone   IVPB   100 mL/Hr IV Intermittent (05-24-24 @ 00:16)    piperacillin/tazobactam IVPB..   25 mL/Hr IV Intermittent (05-26-24 @ 14:16)   25 mL/Hr IV Intermittent (05-26-24 @ 05:43)   25 mL/Hr IV Intermittent (05-25-24 @ 21:49)   25 mL/Hr IV Intermittent (05-25-24 @ 13:15)   25 mL/Hr IV Intermittent (05-25-24 @ 06:28)   25 mL/Hr IV Intermittent (05-24-24 @ 22:08)   25 mL/Hr IV Intermittent (05-24-24 @ 15:27)    piperacillin/tazobactam IVPB..   25 mL/Hr IV Intermittent (05-27-24 @ 05:12)   25 mL/Hr IV Intermittent (05-26-24 @ 21:36)    piperacillin/tazobactam IVPB...   200 mL/Hr IV Intermittent (05-24-24 @ 05:25)    vancomycin  IVPB   250 mL/Hr IV Intermittent (05-26-24 @ 21:23)    OTHER MEDS:   MEDICATIONS  (STANDING):  acetaminophen     Tablet .. 650 every 6 hours PRN  aspirin enteric coated 81 daily  enoxaparin Injectable 40 every 24 hours  midodrine. 5 every 8 hours  ondansetron Injectable 4 every 8 hours PRN    VITALS:  Vital Signs Last 24 Hrs  T(F): 98.7 (05-27-24 @ 08:16), Max: 101.5 (05-23-24 @ 22:16)    Vital Signs Last 24 Hrs  HR: 62 (05-27-24 @ 08:16) (58 - 65)  BP: 143/79 (05-27-24 @ 08:16) (129/76 - 152/74)  RR: 18 (05-27-24 @ 08:16)  SpO2: 98% (05-27-24 @ 08:16) (97% - 100%)  Wt(kg): --    EXAM:  Physical Exam:  Constitutional:  well preserved, comfortable  Head/Eyes: no icterus, PERRL, EOMI  ENT:  supple; no thrush  LUNGS:  CTA  CVS:  normal S1, S2, no murmur  Abd:  soft, non-tender; non-distended  Ext:  no edema  Vascular:  IV site no erythema tenderness or discharge  MSK:  joints without swelling  Neuro: AAO X 3, non- focal    Labs:                        12.2   7.14  )-----------( 208      ( 27 May 2024 05:59 )             36.0     05-27    141  |  107  |  10  ----------------------------<  111<H>  3.9   |  23  |  1.00    Ca    8.8      27 May 2024 05:59  Phos  2.6     05-27  Mg     2.00     05-27    TPro  5.7<L>  /  Alb  3.0<L>  /  TBili  0.6  /  DBili  x   /  AST  19  /  ALT  17  /  AlkPhos  141<H>  05-27    WBC Trend:  WBC Count: 7.14 (05-27-24 @ 05:59)  WBC Count: 8.12 (05-26-24 @ 14:36)  WBC Count: 17.91 (05-25-24 @ 05:25)  WBC Count: 7.21 (05-24-24 @ 00:00)    Auto Neutrophil #: 4.57 K/uL (05-27-24 @ 05:59)  Auto Neutrophil #: 6.94 K/uL (05-24-24 @ 00:00)  Band Neutrophils %: 5.5 % (05-24-24 @ 00:00)    Creatine Trend:  Creatinine: 1.00 (05-27)  Creatinine: 1.10 (05-26)  Creatinine: 1.05 (05-25)  Creatinine: 1.06 (05-24)    Liver Biochemical Testing Trend:  Alanine Aminotransferase (ALT/SGPT): 17 (05-27)  Alanine Aminotransferase (ALT/SGPT): 21 (05-26)  Alanine Aminotransferase (ALT/SGPT): 23 (05-25)  Alanine Aminotransferase (ALT/SGPT): 13 (05-24)  Aspartate Aminotransferase (AST/SGOT): 19 (05-27-24 @ 05:59)  Aspartate Aminotransferase (AST/SGOT): 34 (05-26-24 @ 05:32)  Aspartate Aminotransferase (AST/SGOT): 48 (05-25-24 @ 05:25)  Aspartate Aminotransferase (AST/SGOT): 23 (05-24-24 @ 00:00)  Bilirubin Total: 0.6 (05-27)  Bilirubin Direct: 0.2 (05-26)  Bilirubin Total: 0.6 (05-26)  Bilirubin Direct: 0.6 (05-25)  Bilirubin Total: 0.9 (05-25)    Trend LDH    Auto Eosinophil %: 2.8 % (05-27-24 @ 05:59)    Urinalysis Basic - ( 27 May 2024 05:59 )    Color: x / Appearance: x / SG: x / pH: x  Gluc: 111 mg/dL / Ketone: x  / Bili: x / Urobili: x   Blood: x / Protein: x / Nitrite: x   Leuk Esterase: x / RBC: x / WBC x   Sq Epi: x / Non Sq Epi: x / Bacteria: x    MICROBIOLOGY:    Culture - Urine (collected 24 May 2024 07:55)  Source: Clean Catch Clean Catch (Midstream)  Preliminary Report:    >100,000 CFU/ml Escherichia coli    Culture - Blood (collected 24 May 2024 00:00)  Source: .Blood Blood-Peripheral  Preliminary Report:    No growth at 48 Hours    Culture - Blood (collected 23 May 2024 23:50)  Source: .Blood Blood-Peripheral  Preliminary Report:    No growth at 48 Hours    Blood Gas Venous - Lactate: 1.2 (05-26 @ 21:10)  Blood Gas Venous - Lactate: 2.3 (05-26 @ 10:17)    RADIOLOGY:  imaging below personally reviewed    < from: CT Abdomen and Pelvis w/ IV Cont (05.24.24 @ 02:34) >  IMPRESSION:  1.   Wall thickening of the descending and proximal sigmoid colon   consistent with colitis. No bowel obstruction.    --- End of Report ---    < end of copied text >           HPI:    74F with PMHx of HTN presented on 5/24 for several day history of lower abdominal pain, polyuria, dysuria, fever, rigors, and night sweats. She states abdominal pain is bilateral which radiates a in a bandlike fashion toward the back. Also endorsing minor urinary complaints. She went to her PMD and was empirically treated for UTI and given Ciprofloxacin (per son work up was sent i.e., UA, but not followed up on yet). She took only one dose of Ciprofloxacin. On the day of admission developed rigors and diaphoresis. Given these symptoms son brought patient in for further evaluation. Patient denies diarrhea. Has had periodic nausea and poor PO intake. In the ED on arrival was febrile to 101. 5 F and hypotensive to 82/61. She was given Zosyn, Ceftriaxone and 2.5 L NS. Seen by MICU and not a candidate. Started on midodrine to help support blood pressure. CT A&P showed: "Wall thickening of the descending and proximal sigmoid colon consistent with colitis." Patient without major complaints when evaluated by medicine in the ED. Denies lightheadedness/dizziness. (24 May 2024 06:23)     ID consulted for UTI and colitis.     REVIEW OF SYSTEMS  [  ] ROS unobtainable because:    [X] All other systems negative except as noted below    Constitutional:  [ ] fever [ ] chills  [ ] weight loss  [ ]night sweat  [ ]poor appetite/PO intake [ ]fatigue   Skin:  [ ] rash [ ] phlebitis	  Eyes: [ ] icterus [ ] pain  [ ] discharge	  ENMT: [ ] sore throat  [ ] thrush [ ] ulcers [ ] exudates [ ]anosmia  Respiratory: [ ] dyspnea [ ] hemoptysis [ ] cough [ ] sputum	  Cardiovascular:  [ ] chest pain [ ] palpitations [ ] edema	  Gastrointestinal:  [ ] nausea [ ] vomiting [X ] diarrhea [ ] constipation [ ] pain	  Genitourinary:  [ ] dysuria [ ] frequency [ ] hematuria [ ] discharge [ ] flank pain  [ ] incontinence  Musculoskeletal:  [ ] myalgias [ ] arthralgias [ ] arthritis  [ ] back pain  Neurological:  [ ] headache [ ] weakness [ ] seizures  [ ] confusion/altered mental status    prior hospital charts reviewed [V]  primary team notes reviewed [V]  other consultant notes reviewed [V]    PAST MEDICAL & SURGICAL HISTORY:  Hypertension    Hyperlipidemia    History of atrial myxoma    SOCIAL HISTORY:  - Denied smoking/vaping/alcohol/recreational drug use    FAMILY HISTORY:  FH: hypertension    Allergies  No Known Allergies    ANTIMICROBIALS:  piperacillin/tazobactam IVPB.. 3.375 every 8 hours    ANTIMICROBIALS (past 90 days):  MEDICATIONS  (STANDING):  cefTRIAXone   IVPB   100 mL/Hr IV Intermittent (05-24-24 @ 00:16)    piperacillin/tazobactam IVPB..   25 mL/Hr IV Intermittent (05-26-24 @ 14:16)   25 mL/Hr IV Intermittent (05-26-24 @ 05:43)   25 mL/Hr IV Intermittent (05-25-24 @ 21:49)   25 mL/Hr IV Intermittent (05-25-24 @ 13:15)   25 mL/Hr IV Intermittent (05-25-24 @ 06:28)   25 mL/Hr IV Intermittent (05-24-24 @ 22:08)   25 mL/Hr IV Intermittent (05-24-24 @ 15:27)    piperacillin/tazobactam IVPB..   25 mL/Hr IV Intermittent (05-27-24 @ 05:12)   25 mL/Hr IV Intermittent (05-26-24 @ 21:36)    piperacillin/tazobactam IVPB...   200 mL/Hr IV Intermittent (05-24-24 @ 05:25)    vancomycin  IVPB   250 mL/Hr IV Intermittent (05-26-24 @ 21:23)    OTHER MEDS:   MEDICATIONS  (STANDING):  acetaminophen     Tablet .. 650 every 6 hours PRN  aspirin enteric coated 81 daily  enoxaparin Injectable 40 every 24 hours  midodrine. 5 every 8 hours  ondansetron Injectable 4 every 8 hours PRN    VITALS:  Vital Signs Last 24 Hrs  T(F): 98.7 (05-27-24 @ 08:16), Max: 101.5 (05-23-24 @ 22:16)    Vital Signs Last 24 Hrs  HR: 62 (05-27-24 @ 08:16) (58 - 65)  BP: 143/79 (05-27-24 @ 08:16) (129/76 - 152/74)  RR: 18 (05-27-24 @ 08:16)  SpO2: 98% (05-27-24 @ 08:16) (97% - 100%)  Wt(kg): --    EXAM:  Physical Exam:  Constitutional:  well preserved, comfortable  Head/Eyes: no icterus, PERRL, EOMI  ENT:  supple; no thrush  LUNGS:  CTA  CVS:  normal S1, S2, no murmur  Abd:  soft, non-tender; non-distended  Ext:  no edema  Vascular:  IV site no erythema tenderness or discharge  MSK:  joints without swelling  Neuro: AAO X 3, non- focal    Labs:                        12.2   7.14  )-----------( 208      ( 27 May 2024 05:59 )             36.0     05-27    141  |  107  |  10  ----------------------------<  111<H>  3.9   |  23  |  1.00    Ca    8.8      27 May 2024 05:59  Phos  2.6     05-27  Mg     2.00     05-27    TPro  5.7<L>  /  Alb  3.0<L>  /  TBili  0.6  /  DBili  x   /  AST  19  /  ALT  17  /  AlkPhos  141<H>  05-27    WBC Trend:  WBC Count: 7.14 (05-27-24 @ 05:59)  WBC Count: 8.12 (05-26-24 @ 14:36)  WBC Count: 17.91 (05-25-24 @ 05:25)  WBC Count: 7.21 (05-24-24 @ 00:00)    Auto Neutrophil #: 4.57 K/uL (05-27-24 @ 05:59)  Auto Neutrophil #: 6.94 K/uL (05-24-24 @ 00:00)  Band Neutrophils %: 5.5 % (05-24-24 @ 00:00)    Creatine Trend:  Creatinine: 1.00 (05-27)  Creatinine: 1.10 (05-26)  Creatinine: 1.05 (05-25)  Creatinine: 1.06 (05-24)    Liver Biochemical Testing Trend:  Alanine Aminotransferase (ALT/SGPT): 17 (05-27)  Alanine Aminotransferase (ALT/SGPT): 21 (05-26)  Alanine Aminotransferase (ALT/SGPT): 23 (05-25)  Alanine Aminotransferase (ALT/SGPT): 13 (05-24)  Aspartate Aminotransferase (AST/SGOT): 19 (05-27-24 @ 05:59)  Aspartate Aminotransferase (AST/SGOT): 34 (05-26-24 @ 05:32)  Aspartate Aminotransferase (AST/SGOT): 48 (05-25-24 @ 05:25)  Aspartate Aminotransferase (AST/SGOT): 23 (05-24-24 @ 00:00)  Bilirubin Total: 0.6 (05-27)  Bilirubin Direct: 0.2 (05-26)  Bilirubin Total: 0.6 (05-26)  Bilirubin Direct: 0.6 (05-25)  Bilirubin Total: 0.9 (05-25)    Trend LDH    Auto Eosinophil %: 2.8 % (05-27-24 @ 05:59)    Urinalysis Basic - ( 27 May 2024 05:59 )    Color: x / Appearance: x / SG: x / pH: x  Gluc: 111 mg/dL / Ketone: x  / Bili: x / Urobili: x   Blood: x / Protein: x / Nitrite: x   Leuk Esterase: x / RBC: x / WBC x   Sq Epi: x / Non Sq Epi: x / Bacteria: x    MICROBIOLOGY:    Culture - Urine (collected 24 May 2024 07:55)  Source: Clean Catch Clean Catch (Midstream)  Preliminary Report:    >100,000 CFU/ml Escherichia coli    Culture - Blood (collected 24 May 2024 00:00)  Source: .Blood Blood-Peripheral  Preliminary Report:    No growth at 48 Hours    Culture - Blood (collected 23 May 2024 23:50)  Source: .Blood Blood-Peripheral  Preliminary Report:    No growth at 48 Hours    Blood Gas Venous - Lactate: 1.2 (05-26 @ 21:10)  Blood Gas Venous - Lactate: 2.3 (05-26 @ 10:17)    RADIOLOGY:  imaging below personally reviewed    < from: CT Abdomen and Pelvis w/ IV Cont (05.24.24 @ 02:34) >  IMPRESSION:  1.   Wall thickening of the descending and proximal sigmoid colon   consistent with colitis. No bowel obstruction.    --- End of Report ---    < end of copied text >

## 2024-05-27 NOTE — CONSULT NOTE ADULT - ASSESSMENT
74F with PMHx of HTN presented on 5/24 for several day history of lower abdominal pain, polyuria, dysuria, fever, rigors, and night sweats.    Febrile, continues to spike fever last this am 100.4F  Leukocytosis 17-->  trended normal     Workup:   -UA: , + bacteria, LE and nitrite   -Urine Cx: E coli > 100K Cfu/ml  -Blood Cx: NGTD    -CT AP W/IV cont: Wall thickening of the descending and proximal sigmoid colon consistent with colitis. No bowel obstruction.  -GI PCR + E coli EAEC  -RSV/Flu/Covid neg     Antimicrobials:   Zosyn 5/24-->  Vanc X 1     #Sepsis, fever, leukocytosis   #UTI  #Diarrhea, colitis, GI PCR + EAEC    Recommendations:         PT TO BE SEEN. PRELIM NOTE  PENDING RECS. PLEASE WAIT FOR FINAL RECS AFTER DISCUSSION WITH ATTENDING#       74F with PMHx of HTN presented on 5/24 for several day history of lower abdominal pain, polyuria, dysuria, fever, rigors, and night sweats.    Febrile, continues to spike fever last this am 100.4F  Leukocytosis 17-->  trended normal     Workup:   -UA: , + bacteria, LE and nitrite   -Urine Cx: E coli > 100K Cfu/ml  -Blood Cx: NGTD    -CT AP W/IV cont: Wall thickening of the descending and proximal sigmoid colon consistent with colitis. No bowel obstruction.  -GI PCR + E coli EAEC  -RSV/Flu/Covid neg     Antimicrobials:   Zosyn 5/24-->  Vanc X 1     #Sepsis, fever, leukocytosis   #UTI, E coli ESBL  #Diarrhea, colitis, GI PCR + EAEC    Recommendations:   -Start Ertapenem 1 g IV daily   -Discontinue Zosyn   -Follow up final blood Cx   -Monitor for more diarrheal episodes    Discussed with Dr Jose Espinosa MD, PGY5  ID fellow  [employer] Preferred  After 5pm/weekends call [phone]

## 2024-05-28 LAB
-  FOSFOMYCIN: SIGNIFICANT CHANGE UP
ALBUMIN SERPL ELPH-MCNC: 3.3 G/DL — SIGNIFICANT CHANGE UP (ref 3.3–5)
ALP SERPL-CCNC: 137 U/L — HIGH (ref 40–120)
ALT FLD-CCNC: 15 U/L — SIGNIFICANT CHANGE UP (ref 4–33)
ANION GAP SERPL CALC-SCNC: 10 MMOL/L — SIGNIFICANT CHANGE UP (ref 7–14)
AST SERPL-CCNC: 15 U/L — SIGNIFICANT CHANGE UP (ref 4–32)
BASOPHILS # BLD AUTO: 0.07 K/UL — SIGNIFICANT CHANGE UP (ref 0–0.2)
BASOPHILS NFR BLD AUTO: 1 % — SIGNIFICANT CHANGE UP (ref 0–2)
BILIRUB SERPL-MCNC: 0.5 MG/DL — SIGNIFICANT CHANGE UP (ref 0.2–1.2)
BUN SERPL-MCNC: 9 MG/DL — SIGNIFICANT CHANGE UP (ref 7–23)
CALCIUM SERPL-MCNC: 9 MG/DL — SIGNIFICANT CHANGE UP (ref 8.4–10.5)
CHLORIDE SERPL-SCNC: 107 MMOL/L — SIGNIFICANT CHANGE UP (ref 98–107)
CO2 SERPL-SCNC: 23 MMOL/L — SIGNIFICANT CHANGE UP (ref 22–31)
CREAT SERPL-MCNC: 0.94 MG/DL — SIGNIFICANT CHANGE UP (ref 0.5–1.3)
CULTURE RESULTS: SIGNIFICANT CHANGE UP
EGFR: 64 ML/MIN/1.73M2 — SIGNIFICANT CHANGE UP
EOSINOPHIL # BLD AUTO: 0.36 K/UL — SIGNIFICANT CHANGE UP (ref 0–0.5)
EOSINOPHIL NFR BLD AUTO: 5 % — SIGNIFICANT CHANGE UP (ref 0–6)
GLUCOSE BLDC GLUCOMTR-MCNC: 118 MG/DL — HIGH (ref 70–99)
GLUCOSE SERPL-MCNC: 99 MG/DL — SIGNIFICANT CHANGE UP (ref 70–99)
HCT VFR BLD CALC: 37.8 % — SIGNIFICANT CHANGE UP (ref 34.5–45)
HGB BLD-MCNC: 12.9 G/DL — SIGNIFICANT CHANGE UP (ref 11.5–15.5)
IANC: 3.99 K/UL — SIGNIFICANT CHANGE UP (ref 1.8–7.4)
IMM GRANULOCYTES NFR BLD AUTO: 1 % — HIGH (ref 0–0.9)
LYMPHOCYTES # BLD AUTO: 1.73 K/UL — SIGNIFICANT CHANGE UP (ref 1–3.3)
LYMPHOCYTES # BLD AUTO: 24.2 % — SIGNIFICANT CHANGE UP (ref 13–44)
MAGNESIUM SERPL-MCNC: 2.2 MG/DL — SIGNIFICANT CHANGE UP (ref 1.6–2.6)
MCHC RBC-ENTMCNC: 29.8 PG — SIGNIFICANT CHANGE UP (ref 27–34)
MCHC RBC-ENTMCNC: 34.1 GM/DL — SIGNIFICANT CHANGE UP (ref 32–36)
MCV RBC AUTO: 87.3 FL — SIGNIFICANT CHANGE UP (ref 80–100)
METHOD TYPE: SIGNIFICANT CHANGE UP
MONOCYTES # BLD AUTO: 0.93 K/UL — HIGH (ref 0–0.9)
MONOCYTES NFR BLD AUTO: 13 % — SIGNIFICANT CHANGE UP (ref 2–14)
NEUTROPHILS # BLD AUTO: 3.99 K/UL — SIGNIFICANT CHANGE UP (ref 1.8–7.4)
NEUTROPHILS NFR BLD AUTO: 55.8 % — SIGNIFICANT CHANGE UP (ref 43–77)
NRBC # BLD: 0 /100 WBCS — SIGNIFICANT CHANGE UP (ref 0–0)
NRBC # FLD: 0 K/UL — SIGNIFICANT CHANGE UP (ref 0–0)
PHOSPHATE SERPL-MCNC: 3.2 MG/DL — SIGNIFICANT CHANGE UP (ref 2.5–4.5)
PLATELET # BLD AUTO: 243 K/UL — SIGNIFICANT CHANGE UP (ref 150–400)
POTASSIUM SERPL-MCNC: 4 MMOL/L — SIGNIFICANT CHANGE UP (ref 3.5–5.3)
POTASSIUM SERPL-SCNC: 4 MMOL/L — SIGNIFICANT CHANGE UP (ref 3.5–5.3)
PROT SERPL-MCNC: 6 G/DL — SIGNIFICANT CHANGE UP (ref 6–8.3)
RBC # BLD: 4.33 M/UL — SIGNIFICANT CHANGE UP (ref 3.8–5.2)
RBC # FLD: 13.2 % — SIGNIFICANT CHANGE UP (ref 10.3–14.5)
SODIUM SERPL-SCNC: 140 MMOL/L — SIGNIFICANT CHANGE UP (ref 135–145)
SPECIMEN SOURCE: SIGNIFICANT CHANGE UP
WBC # BLD: 7.15 K/UL — SIGNIFICANT CHANGE UP (ref 3.8–10.5)
WBC # FLD AUTO: 7.15 K/UL — SIGNIFICANT CHANGE UP (ref 3.8–10.5)

## 2024-05-28 PROCEDURE — 99232 SBSQ HOSP IP/OBS MODERATE 35: CPT

## 2024-05-28 RX ADMIN — Medication 81 MILLIGRAM(S): at 21:42

## 2024-05-28 RX ADMIN — ERTAPENEM SODIUM 120 MILLIGRAM(S): 1 INJECTION, POWDER, LYOPHILIZED, FOR SOLUTION INTRAMUSCULAR; INTRAVENOUS at 16:09

## 2024-05-28 NOTE — PROGRESS NOTE ADULT - PROBLEM SELECTOR PLAN 1
pt admitted with fever,  abdominal pain, polyuria, dysuria, fever, rigors, and night sweats.  on arrival febrile, elevated HR (106) and hypotensive -> met criteria for sepsis with Likely source UTI   Started on Ceftriaxone and escalated to Zosyn on admission. s/p 2500 cc IVF in ED   was seen by MICU and not deemed candidate for ICU. recommended midodrine for hypotension   BCX: No growth at 24 hrs   UCx: noted to have >100K E.Coli     - Continue Zosyn for now  - Follow up urine culture and sensitivities. Follow up blood cultures   - Montior VS q4h   - Patient was started on Midodrine 5mg TID and discontinue once BP stable   - Continue IVF for now
-pt admitted with fever,  abdominal pain, polyuria, dysuria, fever, rigors, and night sweats.  -on arrival febrile, elevated HR (106) and hypotensive -> met criteria for sepsis with Likely source UTI   -urine cx growing growing ESBL E coliStarted on Ceftriaxone and escalated to Zosyn on admission. Now Ertapenem given ESBL  -GI PCR positive for EA E coli  - Montior VS q4h   - Patient was started on Midodrine 5mg TID and discontinue once BP stable. D/valery on 5/27 given normotension
pt admitted with fever,  abdominal pain, polyuria, dysuria, fever, rigors, and night sweats.  on arrival febrile, elevated HR (106) and hypotensive -> met criteria for sepsis with Likely source UTI   Started on Ceftriaxone and escalated to Zosyn on admission. s/p 2500 cc IVF in ED   was seen by MICU and not deemed candidate for ICU. recommended midodrine for hypotension   BCX: No growth at 24 hrs     - Continue Zosyn   - Follow up urine and blood cultures   - Montior VS q4h   - Patient was started on Midodrine 5mg TID and discontinue once BP stable   - Continue IVF for now
-pt admitted with fever,  abdominal pain, polyuria, dysuria, fever, rigors, and night sweats.  -on arrival febrile, elevated HR (106) and hypotensive -> met criteria for sepsis with Likely source UTI   -urine cx growing growing ESBL E coliStarted on Ceftriaxone and escalated to Zosyn on admission. Now Ertapenem given ESBL  -GI PCR positive for EA E coli  - Montior VS q4h   - Patient was started on Midodrine 5mg TID and discontinue once BP stable. D/valery on 5/27 given normotension

## 2024-05-28 NOTE — PROGRESS NOTE ADULT - PROBLEM SELECTOR PLAN 4
- Hold home Losartan 50 mg daily i/s/o sepsis  - DASH diet   - Montior VS, BP still soft currently but improving from before

## 2024-05-28 NOTE — PROGRESS NOTE ADULT - PROBLEM SELECTOR PLAN 2
Likely UTI given symptoms and UA. No signs of ascending infection on CT  UCx: noted to have >100K E.Coli     - Continue with Zosyn, de-escalate if possible  - Follow up urine culture and sensitivities. Follow up blood cultures   - Continue IVF for now  - Continue Midodrine 5mg TID to support BP, wean as tolerated
Likely UTI given symptoms and UA. No signs of ascending infection on CT  UCx: noted to have >100K E.Coli, ESBL
Likely UTI given symptoms and UA. No signs of ascending infection on CT  - Continue with Zosyn, de-escalate if possible  - Follow up blood and urine cultures  - Continue IVF for now  - Start Midodrine 10 mg TID to support BP, wean as tolerated
Likely UTI given symptoms and UA. No signs of ascending infection on CT  UCx: noted to have >100K E.Coli, ESBL

## 2024-05-28 NOTE — PROGRESS NOTE ADULT - PROBLEM SELECTOR PLAN 5
DVT ppx: Lovenox  diet: Full liquid diet and advance as tolerated     Dispo: pending once medically optimized     Discussed plan of care with son over the phone while at bedside and all qs answered on 5/26.
DVT ppx: Lovenox    Dispo: pending once medically optimized
DVT ppx: Lovenox    Dispo: pending once medically optimized

## 2024-05-28 NOTE — PROGRESS NOTE ADULT - SUBJECTIVE AND OBJECTIVE BOX
Infectious Diseases Follow Up:    Patient is a 74y old  Female who presents with a chief complaint of Abdominal Pain (27 May 2024 15:51)      Interval History/ROS:  Mongolian  ID: [id number]  Pt feeling much improved, no fevers ON, abdominal pain improved     Allergies  No Known Allergies        ANTIMICROBIALS:  ertapenem  IVPB 1000 every 24 hours  ertapenem  IVPB        Current Abx:     Previous Abx     OTHER MEDS:  MEDICATIONS  (STANDING):  acetaminophen     Tablet .. 650 every 6 hours PRN  aspirin enteric coated 81 daily  enoxaparin Injectable 40 every 24 hours  ondansetron Injectable 4 every 8 hours PRN      Vital Signs Last 24 Hrs  T(C): 36.7 (28 May 2024 06:01), Max: 36.9 (27 May 2024 14:55)  T(F): 98.1 (28 May 2024 06:01), Max: 98.5 (27 May 2024 18:38)  HR: 56 (28 May 2024 06:01) (56 - 61)  BP: 159/68 (28 May 2024 06:01) (137/79 - 159/68)  BP(mean): --  RR: 17 (28 May 2024 06:01) (17 - 19)  SpO2: 99% (28 May 2024 06:01) (97% - 100%)    Parameters below as of 28 May 2024 06:01  Patient On (Oxygen Delivery Method): room air        PHYSICAL EXAM:  GENERAL: NAD, well-developed  HEAD:  Atraumatic, Normocephalic  EYES: EOMI, conjunctiva and sclera clear  CHEST/LUNG: On RA, not in respiratory distress   PSYCH: AAOx3                          12.9   7.15  )-----------( 243      ( 28 May 2024 07:50 )             37.8       05-28    140  |  107  |  9   ----------------------------<  99  4.0   |  23  |  0.94    Ca    9.0      28 May 2024 07:50  Phos  3.2     05-28  Mg     2.20     05-28    TPro  6.0  /  Alb  3.3  /  TBili  0.5  /  DBili  x   /  AST  15  /  ALT  15  /  AlkPhos  137<H>  05-28      Urinalysis Basic - ( 28 May 2024 07:50 )    Color: x / Appearance: x / SG: x / pH: x  Gluc: 99 mg/dL / Ketone: x  / Bili: x / Urobili: x   Blood: x / Protein: x / Nitrite: x   Leuk Esterase: x / RBC: x / WBC x   Sq Epi: x / Non Sq Epi: x / Bacteria: x        MICROBIOLOGY:  v  .Blood Blood-Peripheral  05-26-24   No growth at 24 hours  --  --      .Blood Blood-Peripheral  05-26-24   No growth at 24 hours  --  --      .Stool Feces  05-26-24   No enteric pathogens to date: Final culture pending  --  --      Clean Catch Clean Catch (Midstream)  05-24-24   >100,000 CFU/ml Escherichia coli ESBL  --  Escherichia coli ESBL      .Blood Blood-Peripheral  05-24-24   No growth at 4 days  --  --      .Blood Blood-Peripheral  05-23-24   No growth at 4 days  --  --          RADIOLOGY:  < from: CT Abdomen and Pelvis w/ IV Cont (05.24.24 @ 02:34) >  FINDINGS:  Liver: Normal. No mass.  Gallbladder and bile ducts: Normal. No calcified stones. No ductal   dilation.  Pancreas: Normal. No ductal dilation.  Spleen: Normal. No splenomegaly.  Adrenal glands: Normal. No mass.  Kidneys and ureters: Prominent simple 6.0 cm left renal cyst.  Stomach and bowel: Sigmoid diverticulosis. Wall thickening of the   descending and proximal sigmoid colon consistent with colitis. No bowel   obstruction.  Appendix: No evidence of appendicitis.    Intraperitoneal space: Unremarkable. No free air. No significant fluid  collection.  Vasculature: Unremarkable. No abdominal aortic aneurysm.  Lymph nodes: Unremarkable. No enlarged lymph nodes.  Urinary bladder: Unremarkable as visualized.  Reproductive: Unremarkable as visualized.  Bones/joints: Degenerative changes. No acute fracture.  Soft tissues: Unremarkable.    IMPRESSION:  1.   Wall thickening of the descending and proximal sigmoid colon   consistent with colitis. No bowel obstruction.    < end of copied text >    
PROGRESS NOTE:   Authored by Dr. Sofia Gavin MD, pager [phone]     Patient is a 74y old  Female who presents with a chief complaint of Abdominal Pain (27 May 2024 08:27)      SUBJECTIVE / OVERNIGHT EVENTS:  Pt is sitting up in chair. Interviewed in Ivorian, as provider speaks some Ivorian. Feeling better.     ADDITIONAL REVIEW OF SYSTEMS:    MEDICATIONS  (STANDING):  aspirin enteric coated 81 milliGRAM(s) Oral daily  enoxaparin Injectable 40 milliGRAM(s) SubCutaneous every 24 hours  ertapenem  IVPB 1000 milliGRAM(s) IV Intermittent once  ertapenem  IVPB        MEDICATIONS  (PRN):  acetaminophen     Tablet .. 650 milliGRAM(s) Oral every 6 hours PRN Temp greater or equal to 38C (100.4F), Mild Pain (1 - 3)  ondansetron Injectable 4 milliGRAM(s) IV Push every 8 hours PRN Nausea and/or Vomiting      CAPILLARY BLOOD GLUCOSE      POCT Blood Glucose.: 102 mg/dL (27 May 2024 12:03)  POCT Blood Glucose.: 106 mg/dL (27 May 2024 08:33)  POCT Blood Glucose.: 118 mg/dL (27 May 2024 05:57)  POCT Blood Glucose.: 110 mg/dL (26 May 2024 23:39)  POCT Blood Glucose.: 112 mg/dL (26 May 2024 17:56)    I&O's Summary      PHYSICAL EXAM:  Vital Signs Last 24 Hrs  T(C): 36.9 (27 May 2024 14:55), Max: 38.4 (26 May 2024 17:20)  T(F): 98.4 (27 May 2024 14:55), Max: 101.1 (26 May 2024 17:20)  HR: 56 (27 May 2024 14:55) (55 - 65)  BP: 137/79 (27 May 2024 14:55) (129/76 - 152/74)  BP(mean): --  RR: 19 (27 May 2024 14:55) (16 - 19)  SpO2: 99% (27 May 2024 14:55) (97% - 100%)    Parameters below as of 27 May 2024 14:55  Patient On (Oxygen Delivery Method): room air        CONSTITUTIONAL: NAD, well-developed  RESPIRATORY: Normal respiratory effort; lungs are clear to auscultation bilaterally  CARDIOVASCULAR: Regular rate and rhythm, normal S1 and S2, no murmur/rub/gallop; No lower extremity edema; Peripheral pulses are 2+ bilaterally  ABDOMEN: Nontender to palpation, normoactive bowel sounds, no rebound/guarding; No hepatosplenomegaly  MUSCULOSKELETAL: no clubbing or cyanosis of digits; no joint swelling or tenderness to palpation  PSYCH: A+O to person, place, and time; affect appropriate    LABS:                        12.2   7.14  )-----------( 208      ( 27 May 2024 05:59 )             36.0     05-27    141  |  107  |  10  ----------------------------<  111<H>  3.9   |  23  |  1.00    Ca    8.8      27 May 2024 05:59  Phos  2.6     05-27  Mg     2.00     05-27    TPro  5.7<L>  /  Alb  3.0<L>  /  TBili  0.6  /  DBili  x   /  AST  19  /  ALT  17  /  AlkPhos  141<H>  05-27          Urinalysis Basic - ( 27 May 2024 05:59 )    Color: x / Appearance: x / SG: x / pH: x  Gluc: 111 mg/dL / Ketone: x  / Bili: x / Urobili: x   Blood: x / Protein: x / Nitrite: x   Leuk Esterase: x / RBC: x / WBC x   Sq Epi: x / Non Sq Epi: x / Bacteria: x          RADIOLOGY & ADDITIONAL TESTS:  Results Reviewed:   Imaging Personally Reviewed:  Electrocardiogram Personally Reviewed:    COORDINATION OF CARE:  Care Discussed with Consultants/Other Providers [Y/N]:  Prior or Outpatient Records Reviewed [Y/N]:  
[facility] Division of Hospital Medicine  Darline Cruz MD  Pager [phone]    Patient is a 74y old  Female who presents with a chief complaint of Abdominal Pain       SUBJECTIVE / OVERNIGHT EVENTS: spoke with pt via  #[id number] Amandeep; reports she is feeling much better and is very grateful for her care here      MEDICATIONS  (STANDING):  aspirin enteric coated 81 milliGRAM(s) Oral daily  enoxaparin Injectable 40 milliGRAM(s) SubCutaneous every 24 hours  ertapenem  IVPB 1000 milliGRAM(s) IV Intermittent every 24 hours  ertapenem  IVPB        MEDICATIONS  (PRN):  acetaminophen     Tablet .. 650 milliGRAM(s) Oral every 6 hours PRN Temp greater or equal to 38C (100.4F), Mild Pain (1 - 3)  ondansetron Injectable 4 milliGRAM(s) IV Push every 8 hours PRN Nausea and/or Vomiting      CAPILLARY BLOOD GLUCOSE  POCT Blood Glucose.: 118 mg/dL (28 May 2024 12:02)  POCT Blood Glucose.: 102 mg/dL (28 May 2024 07:02)  POCT Blood Glucose.: 107 mg/dL (27 May 2024 22:11)  POCT Blood Glucose.: 98 mg/dL (27 May 2024 17:20)        PHYSICAL EXAM:  Vital Signs Last 24 Hrs  T(F): 98.1 (28 May 2024 12:10), Max: 98.5 (27 May 2024 18:38)  HR: 55 (28 May 2024 12:10) (55 - 61)  BP: 143/82 (28 May 2024 12:10) (143/82 - 159/68)  RR: 17 (28 May 2024 12:10) (17 - 18)  SpO2: 98% (28 May 2024 12:10) (97% - 100%)    Parameters below as of 28 May 2024 12:10  Patient On (Oxygen Delivery Method): room air        CONSTITUTIONAL: NAD, well-developed, well-groomed  EYES: PERRLA; conjunctiva and sclera clear  ENMT: Moist oral mucosa, no pharyngeal injection or exudates; normal dentition  RESPIRATORY: Normal respiratory effort; lungs are clear to auscultation bilaterally  CARDIOVASCULAR: Regular rate and rhythm; No lower extremity edema  ABDOMEN: Nontender to palpation, normoactive bowel sounds  MUSCULOSKELETAL: no clubbing or cyanosis of digits; no joint swelling or tenderness to palpation  PSYCH: A+O to person, place, and time; affect appropriate  NEUROLOGY: CN 2-12 are intact and symmetric; no gross sensory deficits   SKIN: No rashes; no palpable lesions    LABS:                        12.9   7.15  )-----------( 243      ( 28 May 2024 07:50 )             37.8     05-28    140  |  107  |  9   ----------------------------<  99  4.0   |  23  |  0.94    Ca    9.0      28 May 2024 07:50  Phos  3.2     05-28  Mg     2.20     05-28    TPro  6.0  /  Alb  3.3  /  TBili  0.5  /  DBili  x   /  AST  15  /  ALT  15  /  AlkPhos  137<H>  05-28      Culture - Blood (collected 26 May 2024 20:05)  Source: .Blood Blood-Peripheral  Preliminary Report (28 May 2024 01:02):    No growth at 24 hours    Culture - Blood (collected 26 May 2024 19:46)  Source: .Blood Blood-Peripheral  Preliminary Report (28 May 2024 01:02):    No growth at 24 hours    Culture - Stool (collected 26 May 2024 19:19)  Source: .Stool Feces  Preliminary Report (27 May 2024 18:23):    No enteric pathogens to date: Final culture pending      
Patient is a 74y old  Female who presents with a chief complaint of Abdominal Pain (25 May 2024 18:33)      SUBJECTIVE / OVERNIGHT EVENTS:    No events overnight. This AM, patient without n/v/d/cp/sob.  Patient requested to have son translate for her. She reports intermittent pain in abdomen and had two episodes of diarrhea.     MEDICATIONS  (STANDING):  aspirin enteric coated 81 milliGRAM(s) Oral daily  enoxaparin Injectable 40 milliGRAM(s) SubCutaneous every 24 hours  lactated ringers. 1000 milliLiter(s) (70 mL/Hr) IV Continuous <Continuous>  midodrine. 5 milliGRAM(s) Oral every 8 hours  piperacillin/tazobactam IVPB.. 3.375 Gram(s) IV Intermittent every 8 hours    MEDICATIONS  (PRN):  acetaminophen     Tablet .. 650 milliGRAM(s) Oral every 6 hours PRN Temp greater or equal to 38C (100.4F), Mild Pain (1 - 3)  ondansetron Injectable 4 milliGRAM(s) IV Push every 8 hours PRN Nausea and/or Vomiting      PHYSICAL EXAM:  T(C): 36.8 (05-26-24 @ 14:00), Max: 38 (05-26-24 @ 01:56)  HR: 62 (05-26-24 @ 14:00) (57 - 62)  BP: 129/76 (05-26-24 @ 14:00) (108/54 - 143/68)  RR: 16 (05-26-24 @ 14:00) (16 - 19)  SpO2: 100% (05-26-24 @ 14:00) (95% - 100%)  I&O's Summary    GENERAL: NAD, pt resting in bed comfortably  HEAD:  Atraumatic, Normocephalic, MMM  CHEST/LUNG: No use of accessory muscles, CTAB, breathing non-labored  COR: RR, no mrcg  ABD: Soft, ND/ NT, +BS  PSYCH: AAOx3  NEUROLOGY: no focal deficits grossly noted, moving all extremities  SKIN: No rashes or lesions  EXT: no LE edema noted B/L     LABS:  CAPILLARY BLOOD GLUCOSE      POCT Blood Glucose.: 105 mg/dL (26 May 2024 12:54)                          12.2   17.91 )-----------( 197      ( 25 May 2024 05:25 )             36.5     05-26    139  |  107  |  18  ----------------------------<  104<H>  4.0   |  20<L>  |  1.10    Ca    8.7      26 May 2024 05:32  Phos  2.6     05-26  Mg     2.30     05-26    TPro  5.7<L>  /  Alb  3.1<L>  /  TBili  0.6  /  DBili  0.2  /  AST  34<H>  /  ALT  21  /  AlkPhos  135<H>  05-26          Urinalysis Basic - ( 26 May 2024 05:32 )    Color: x / Appearance: x / SG: x / pH: x  Gluc: 104 mg/dL / Ketone: x  / Bili: x / Urobili: x   Blood: x / Protein: x / Nitrite: x   Leuk Esterase: x / RBC: x / WBC x   Sq Epi: x / Non Sq Epi: x / Bacteria: x        Culture - Urine (collected 24 May 2024 07:55)  Source: Clean Catch Clean Catch (Midstream)  Preliminary Report (26 May 2024 08:12):    >100,000 CFU/ml Escherichia coli    Culture - Blood (collected 24 May 2024 00:00)  Source: .Blood Blood-Peripheral  Preliminary Report (26 May 2024 10:01):    No growth at 48 Hours    Culture - Blood (collected 23 May 2024 23:50)  Source: .Blood Blood-Peripheral  Preliminary Report (26 May 2024 10:01):    No growth at 48 Hours        RADIOLOGY & ADDITIONAL TESTS:    Telemetry Personally Reviewed -     Imaging Personally Reviewed -     Imaging Reviewed -     Consultant(s) Notes Reviewed -       Care Discussed with Consultants/Other Providers - 
Patient is a 74y old  Female who presents with a chief complaint of Abdominal Pain (24 May 2024 06:23)      SUBJECTIVE / OVERNIGHT EVENTS:    No events overnight. This AM, patient without n/v/d/cp/sob.  Patient requested son to translate for her. Patient states she had few episode of vomiting this morning an diarrhea.     MEDICATIONS  (STANDING):  aspirin enteric coated 81 milliGRAM(s) Oral daily  midodrine. 5 milliGRAM(s) Oral every 8 hours  piperacillin/tazobactam IVPB.. 3.375 Gram(s) IV Intermittent every 8 hours    MEDICATIONS  (PRN):  acetaminophen     Tablet .. 650 milliGRAM(s) Oral every 6 hours PRN Temp greater or equal to 38C (100.4F), Mild Pain (1 - 3)  ondansetron Injectable 4 milliGRAM(s) IV Push every 8 hours PRN Nausea and/or Vomiting      PHYSICAL EXAM:  T(C): 37.2 (05-25-24 @ 18:04), Max: 37.2 (05-25-24 @ 18:04)  HR: 60 (05-25-24 @ 18:04) (50 - 78)  BP: 129/61 (05-25-24 @ 18:04) (98/68 - 138/66)  RR: 19 (05-25-24 @ 18:04) (17 - 19)  SpO2: 99% (05-25-24 @ 18:04) (97% - 100%)  I&O's Summary    GENERAL: NAD, pt resting in bed comfortably  HEAD:  Atraumatic, Normocephalic, MMM  CHEST/LUNG: No use of accessory muscles, CTAB, breathing non-labored  COR: RR, no mrcg  ABD: Soft, ND/ NT, +BS  PSYCH: AAOx3  NEUROLOGY: no focal deficits grossly noted, moving all extremities  SKIN: No rashes or lesions  EXT: no LE edema noted B/L     LABS:  CAPILLARY BLOOD GLUCOSE                              12.2   17.91 )-----------( 197      ( 25 May 2024 05:25 )             36.5     05-25    138  |  105  |  20  ----------------------------<  125<H>  3.7   |  19<L>  |  1.05    Ca    8.2<L>      25 May 2024 05:25  Phos  2.6     05-25  Mg     2.10     05-25    TPro  7.1  /  Alb  3.9  /  TBili  1.8<H>  /  DBili  0.5<H>  /  AST  23  /  ALT  13  /  AlkPhos  101  05-24    PT/INR - ( 24 May 2024 00:00 )   PT: 13.1 sec;   INR: 1.18 ratio         PTT - ( 24 May 2024 00:00 )  PTT:21.3 sec      Urinalysis Basic - ( 25 May 2024 05:25 )    Color: x / Appearance: x / SG: x / pH: x  Gluc: 125 mg/dL / Ketone: x  / Bili: x / Urobili: x   Blood: x / Protein: x / Nitrite: x   Leuk Esterase: x / RBC: x / WBC x   Sq Epi: x / Non Sq Epi: x / Bacteria: x        Culture - Blood (collected 24 May 2024 00:00)  Source: .Blood Blood-Peripheral  Preliminary Report (25 May 2024 10:02):    No growth at 24 hours    Culture - Blood (collected 23 May 2024 23:50)  Source: .Blood Blood-Peripheral  Preliminary Report (25 May 2024 10:02):    No growth at 24 hours        RADIOLOGY & ADDITIONAL TESTS:    Telemetry Personally Reviewed -     Imaging Personally Reviewed -     Imaging Reviewed -     Consultant(s) Notes Reviewed -       Care Discussed with Consultants/Other Providers -

## 2024-05-28 NOTE — PROGRESS NOTE ADULT - PROBLEM SELECTOR PLAN 3
Pt with diarrhea and vomiting this morning along with abd pain on admission   CT A/P: Wall thickening of the descending and proximal sigmoid colon consistent with colitis. No bowel obstruction.    - Continue with Zosyn for now  - Continue IVF for now  - Antiemetics as needed  - GI PCR ordered and follow up results   - switched to CLD for now and advance as tolerated. can switch to full liquids in am if tolerating dinner
Pt with diarrhea and vomiting this morning along with abd pain on admission   CT A/P: Wall thickening of the descending and proximal sigmoid colon consistent with colitis. No bowel obstruction.  still having two episodes of diarrhea    - Continue with Zosyn for now  - Continue IVF for now  - Antiemetics as needed  - GI PCR + for E coli  -advanced to regular diet, avoid lactose given colitis
Pt with diarrhea and vomiting this morning along with abd pain on admission   CT A/P: Wall thickening of the descending and proximal sigmoid colon consistent with colitis. No bowel obstruction.  still having two episodes of diarrhea    - Continue with Zosyn for now  - Continue IVF for now  - Antiemetics as needed  - GI PCR + for E coli  -advanced to regular diet, avoid lactose given colitis
Pt with diarrhea and vomiting this morning along with abd pain on admission   CT A/P: Wall thickening of the descending and proximal sigmoid colon consistent with colitis. No bowel obstruction.  still having two episodes of diarrhea    - Continue with Zosyn for now  - Continue IVF for now  - Antiemetics as needed  - GI PCR ordered and follow up results   - switched to CLD for now and advance as tolerated. can switch to full liquids this am. can switch to reqular in am if tolerating dinner

## 2024-05-28 NOTE — PROGRESS NOTE ADULT - ASSESSMENT
This is a 73 y/o F w/ PMHx of HTN who presented to [facility] on 5/24 for lower abdominal pain, dysuria, fever. Pt went to PMD and was started on Ciprofloxacin for UTI, U/A was sent but not UCx?  Pt started to have fevers and presented to the ED.   In the ER, pt was febrile to 101.5, hypotensive to 82/61, saturating well on RA.   Labs with leukocytosis to 17.9 (initially 7), lactate 2.6.   U/A with significant pyuria, BCx NGTD, UCx with E Coli.   CT A/P with wall thickening c/f colitis. GI PCR with EAEC  CT Chest negative, Flu/RSV/COVID .    #Septic shock   #ESBL E Coli   #UTI/Pyelonephritis   #Colitis 2/2 EAEC     Overall,  73 y/o F w/ PMHx of HTN presenting with septic shock 2/2 UTI vs colitis. Initially febrile w/ leukocytosis to 17, UCx w/ E Coli, and CT A/P with colitis, GI PCR w/ EAEC. Initially started on Ceftriaxone, then changed to Zosyn.   Now improved with improvement in BP and resolution of leukocytosis. UCx w/ ESBL, would start Ertapenem, colitis unlikely to need additional coverage, EAEC usually symptomatic management.     Plan:   1. Ertapenem 1 g q24 for 7 day course, until 6/2/24   2. Monitor for fevers, clinical improvement   3. Can place midline if pt can go home on antibiotics, no need for labs while antibiotics given < 1 week      Thank you for this consult. Inpatient ID consult team will sign off.    Further changes in lab values, imaging studies, or clinical status will not be known to ID inpatient consultants unless specifically communicated by primary team.    Ezequiel Garcia MD  Attending Physician  Division of Infectious Diseases  Department of Medicine    Please contact through MS Teams message.  Office: [phone] (after 5 PM or weekend)  
74F with PMHx of HTN presenting for several day history of lower abdominal pain, polyuria, dysuria, fever, rigors, and night sweats. Patient on arrival febrile and hypotensive. UA with pyuria. Patient admitted for sepsis secondary to cystitis.  
74F with PMHx of HTN presenting for several day history of lower abdominal pain, polyuria, dysuria, fever, rigors, and night sweats. Patient on arrival febrile and hypotensive. UA with pyuria. Patient admitted for sepsis secondary to cystitis.  
74F with PMHx of HTN presenting for several day history of lower abdominal pain, polyuria, dysuria, fever, rigors, and night sweats. Patient on arrival febrile and hypotensive. UA with pyuria. Patient admitted for sepsis secondary to cystitis. Urine culture positive for ESBL E coli.   
74F with PMHx of HTN presenting for several day history of lower abdominal pain, polyuria, dysuria, fever, rigors, and night sweats. Patient on arrival febrile and hypotensive. UA with pyuria. Patient admitted for sepsis secondary to cystitis. Urine culture positive for ESBL E coli.

## 2024-05-29 ENCOUNTER — TRANSCRIPTION ENCOUNTER (OUTPATIENT)
Age: 74
End: 2024-05-29

## 2024-05-29 VITALS
OXYGEN SATURATION: 100 % | TEMPERATURE: 98 F | DIASTOLIC BLOOD PRESSURE: 78 MMHG | RESPIRATION RATE: 16 BRPM | SYSTOLIC BLOOD PRESSURE: 142 MMHG | HEART RATE: 70 BPM

## 2024-05-29 LAB
ANION GAP SERPL CALC-SCNC: 10 MMOL/L — SIGNIFICANT CHANGE UP (ref 7–14)
BUN SERPL-MCNC: 13 MG/DL — SIGNIFICANT CHANGE UP (ref 7–23)
CALCIUM SERPL-MCNC: 8.4 MG/DL — SIGNIFICANT CHANGE UP (ref 8.4–10.5)
CHLORIDE SERPL-SCNC: 108 MMOL/L — HIGH (ref 98–107)
CO2 SERPL-SCNC: 23 MMOL/L — SIGNIFICANT CHANGE UP (ref 22–31)
CREAT SERPL-MCNC: 0.88 MG/DL — SIGNIFICANT CHANGE UP (ref 0.5–1.3)
CULTURE RESULTS: SIGNIFICANT CHANGE UP
CULTURE RESULTS: SIGNIFICANT CHANGE UP
EGFR: 69 ML/MIN/1.73M2 — SIGNIFICANT CHANGE UP
GLUCOSE SERPL-MCNC: 110 MG/DL — HIGH (ref 70–99)
HCT VFR BLD CALC: 36.4 % — SIGNIFICANT CHANGE UP (ref 34.5–45)
HGB BLD-MCNC: 12 G/DL — SIGNIFICANT CHANGE UP (ref 11.5–15.5)
MAGNESIUM SERPL-MCNC: 2.2 MG/DL — SIGNIFICANT CHANGE UP (ref 1.6–2.6)
MCHC RBC-ENTMCNC: 28.9 PG — SIGNIFICANT CHANGE UP (ref 27–34)
MCHC RBC-ENTMCNC: 33 GM/DL — SIGNIFICANT CHANGE UP (ref 32–36)
MCV RBC AUTO: 87.7 FL — SIGNIFICANT CHANGE UP (ref 80–100)
NRBC # BLD: 0 /100 WBCS — SIGNIFICANT CHANGE UP (ref 0–0)
NRBC # FLD: 0 K/UL — SIGNIFICANT CHANGE UP (ref 0–0)
PHOSPHATE SERPL-MCNC: 3.4 MG/DL — SIGNIFICANT CHANGE UP (ref 2.5–4.5)
PLATELET # BLD AUTO: 236 K/UL — SIGNIFICANT CHANGE UP (ref 150–400)
POTASSIUM SERPL-MCNC: 4 MMOL/L — SIGNIFICANT CHANGE UP (ref 3.5–5.3)
POTASSIUM SERPL-SCNC: 4 MMOL/L — SIGNIFICANT CHANGE UP (ref 3.5–5.3)
RBC # BLD: 4.15 M/UL — SIGNIFICANT CHANGE UP (ref 3.8–5.2)
RBC # FLD: 13.2 % — SIGNIFICANT CHANGE UP (ref 10.3–14.5)
SODIUM SERPL-SCNC: 141 MMOL/L — SIGNIFICANT CHANGE UP (ref 135–145)
SPECIMEN SOURCE: SIGNIFICANT CHANGE UP
SPECIMEN SOURCE: SIGNIFICANT CHANGE UP
WBC # BLD: 8.08 K/UL — SIGNIFICANT CHANGE UP (ref 3.8–10.5)
WBC # FLD AUTO: 8.08 K/UL — SIGNIFICANT CHANGE UP (ref 3.8–10.5)

## 2024-05-29 PROCEDURE — 99239 HOSP IP/OBS DSCHRG MGMT >30: CPT

## 2024-05-29 RX ORDER — ERTAPENEM SODIUM 1 G/1
1 INJECTION, POWDER, LYOPHILIZED, FOR SOLUTION INTRAMUSCULAR; INTRAVENOUS
Qty: 4 | Refills: 0
Start: 2024-05-29 | End: 2024-06-01

## 2024-05-29 RX ADMIN — ERTAPENEM SODIUM 120 MILLIGRAM(S): 1 INJECTION, POWDER, LYOPHILIZED, FOR SOLUTION INTRAMUSCULAR; INTRAVENOUS at 15:27

## 2024-05-29 NOTE — DISCHARGE NOTE PROVIDER - NSDCMRMEDTOKEN_GEN_ALL_CORE_FT
aspirin 81 mg oral tablet: 1 tab(s) orally once a day  ertapenem 1 g injection: 1 gram(s) intravenously every 24 hours through 6/2/24; Last day of antibiotics on 6/2/24. No labs needed.  Flush: peripheral IV with 10cc NS before and after each use  losartan 50 mg oral tablet: 1 tab(s) orally once a day  simvastatin 20 mg oral tablet: 1 tab(s) orally once a day (at bedtime)   aspirin 81 mg oral tablet: 1 tab(s) orally once a day  ertapenem 1 g injection: 1 gram(s) intravenously every 24 hours through 6/2/24; Last day of antibiotics on 6/2/24. No labs needed.  losartan 50 mg oral tablet: 1 tab(s) orally once a day  simvastatin 20 mg oral tablet: 1 tab(s) orally once a day (at bedtime)

## 2024-05-29 NOTE — PHYSICAL THERAPY INITIAL EVALUATION ADULT - LEVEL OF CONSCIOUSNESS, REHAB EVAL
[facility] was 9pm. Calling around.      Fidelia Paniagua RN  10/27/17 1978 alert

## 2024-05-29 NOTE — DISCHARGE NOTE PROVIDER - NSDCCPCAREPLAN_GEN_ALL_CORE_FT
PRINCIPAL DISCHARGE DIAGNOSIS  Diagnosis: Acute UTI  Assessment and Plan of Treatment: Complete IV antibiotics / Ertapenem on Sunday, 6/2/24.

## 2024-05-29 NOTE — PHYSICAL THERAPY INITIAL EVALUATION ADULT - ADDITIONAL COMMENTS
Pt stated she is here visiting her son. Pt stated her son lives in a house, +steps. prior to admission Pt stated she was independent with all mobility and ambulated without an assistive device.     Pt. left comfortable in bedside chair, call bell in reach and in NAD. RN aware of session and pt current position.

## 2024-05-29 NOTE — DISCHARGE NOTE NURSING/CASE MANAGEMENT/SOCIAL WORK - NSSCTYPOFSERV_GEN_ALL_CORE
Visiting Nurse will confirm start of care 5/30/24 for IV antibiotics. Medication and supplies to be delivered to home.

## 2024-05-29 NOTE — DISCHARGE NOTE NURSING/CASE MANAGEMENT/SOCIAL WORK - NSDCPEFALRISK_GEN_ALL_CORE
For information on Fall & Injury Prevention, visit: [email or web address] OR  [email or web address] OR  https://www.cdc.gov/steadi/patient.html

## 2024-05-29 NOTE — DISCHARGE NOTE PROVIDER - CARE PROVIDER_API CALL
Yenny Randle  Internal Medicine  [address] [place] [address]  Phone: [phone]  Fax: [phone]  Follow Up Time: 1 week

## 2024-05-29 NOTE — PHYSICAL THERAPY INITIAL EVALUATION ADULT - PERTINENT HX OF CURRENT PROBLEM, REHAB EVAL
Pt is a 74-year-old female presenting with vomiting and chills.  Patient states she was diagnosed with a urinary tract infection and started ciprofloxacin antibiotics.

## 2024-05-29 NOTE — DISCHARGE NOTE NURSING/CASE MANAGEMENT/SOCIAL WORK - PATIENT PORTAL LINK FT
You can access the FollowMyHealth Patient Portal offered by [facility] by registering at the following website: [email or web address]. By joining [facility]’s FollowMyHealth portal, you will also be able to view your health information using other applications (apps) compatible with our system.

## 2024-05-29 NOTE — DISCHARGE NOTE PROVIDER - ATTENDING DISCHARGE PHYSICAL EXAMINATION:
pt eval by PT, no needs  planned for dc  a/w severe sepsis due to ESBL E Coli due to UTI/pyelo/colitis  clinically improved  to complete course with PIV at home with family assist  VSS  exam stable  medically stable for dc

## 2024-05-29 NOTE — DISCHARGE NOTE PROVIDER - HOSPITAL COURSE
Pt is a 75 yo female a/w severe sepsis with shock due to ESBL E Coli UTI/pyelo and colitis  eval by MICU on admission, not a candidate  clinically improved  ID eval apprec  to complete IV abx 6/2; only 4 days left ertapenem, can go with PIV to complete course

## 2024-06-01 LAB
CULTURE RESULTS: SIGNIFICANT CHANGE UP
CULTURE RESULTS: SIGNIFICANT CHANGE UP
SPECIMEN SOURCE: SIGNIFICANT CHANGE UP
SPECIMEN SOURCE: SIGNIFICANT CHANGE UP

## 2024-06-07 ENCOUNTER — TRANSCRIPTION ENCOUNTER (OUTPATIENT)
Age: 74
End: 2024-06-07